# Patient Record
Sex: FEMALE | Race: BLACK OR AFRICAN AMERICAN | NOT HISPANIC OR LATINO | Employment: FULL TIME | URBAN - METROPOLITAN AREA
[De-identification: names, ages, dates, MRNs, and addresses within clinical notes are randomized per-mention and may not be internally consistent; named-entity substitution may affect disease eponyms.]

---

## 2017-01-27 ENCOUNTER — PATIENT MESSAGE (OUTPATIENT)
Dept: OBSTETRICS AND GYNECOLOGY | Facility: CLINIC | Age: 32
End: 2017-01-27

## 2017-03-13 DIAGNOSIS — Z30.40 CONTRACEPTIVE SURVEILLANCE: ICD-10-CM

## 2017-03-13 RX ORDER — NORETHINDRONE ACETATE AND ETHINYL ESTRADIOL .03; 1.5 MG/1; MG/1
1 TABLET ORAL DAILY
Qty: 28 EACH | Refills: 1 | Status: SHIPPED | OUTPATIENT
Start: 2017-03-13 | End: 2018-09-05

## 2017-03-13 NOTE — TELEPHONE ENCOUNTER
----- Message from Arabella Estrada sent at 3/13/2017  1:41 PM CDT -----  Contact: pt  _x  1st Request  _  2nd Request  _  3rd Request    Please refill the medication(s) listed below.     Medication #1norethindrone ac-eth estradiol (LOESTRIN 1.5/30, 21,) 1.5-30 mg-mcg Tab    Medication #2      Preferred Pharmacy:[facility] [address] [place] [address]

## 2017-04-12 ENCOUNTER — PATIENT MESSAGE (OUTPATIENT)
Dept: OBSTETRICS AND GYNECOLOGY | Facility: CLINIC | Age: 32
End: 2017-04-12

## 2017-04-13 ENCOUNTER — OFFICE VISIT (OUTPATIENT)
Dept: OBSTETRICS AND GYNECOLOGY | Facility: CLINIC | Age: 32
End: 2017-04-13
Payer: COMMERCIAL

## 2017-04-13 VITALS
SYSTOLIC BLOOD PRESSURE: 110 MMHG | HEIGHT: 59 IN | DIASTOLIC BLOOD PRESSURE: 70 MMHG | WEIGHT: 125.13 LBS | BODY MASS INDEX: 25.23 KG/M2

## 2017-04-13 DIAGNOSIS — N76.0 ACUTE VAGINITIS: Primary | ICD-10-CM

## 2017-04-13 DIAGNOSIS — N76.0 BV (BACTERIAL VAGINOSIS): Primary | ICD-10-CM

## 2017-04-13 DIAGNOSIS — B96.89 BV (BACTERIAL VAGINOSIS): Primary | ICD-10-CM

## 2017-04-13 LAB
CANDIDA RRNA VAG QL PROBE: NEGATIVE
G VAGINALIS RRNA GENITAL QL PROBE: POSITIVE
T VAGINALIS RRNA GENITAL QL PROBE: NEGATIVE

## 2017-04-13 PROCEDURE — 1160F RVW MEDS BY RX/DR IN RCRD: CPT | Mod: S$GLB,,, | Performed by: NURSE PRACTITIONER

## 2017-04-13 PROCEDURE — 87480 CANDIDA DNA DIR PROBE: CPT

## 2017-04-13 PROCEDURE — 99999 PR PBB SHADOW E&M-EST. PATIENT-LVL III: CPT | Mod: PBBFAC,,, | Performed by: NURSE PRACTITIONER

## 2017-04-13 PROCEDURE — 99213 OFFICE O/P EST LOW 20 MIN: CPT | Mod: S$GLB,,, | Performed by: NURSE PRACTITIONER

## 2017-04-13 RX ORDER — METRONIDAZOLE 500 MG/1
500 TABLET ORAL EVERY 12 HOURS
Qty: 14 TABLET | Refills: 0 | Status: SHIPPED | OUTPATIENT
Start: 2017-04-13 | End: 2017-04-20

## 2017-04-13 NOTE — PROGRESS NOTES
"Chayo Williamson is a 31 y.o. female  presents to urgent GYN care with complaints of thinking something is "off". Denies abnormal vaginal discharge. Denies vaginal itching. Denies odor. Wants to make sure this isn't the start of a yeast infection. Pt is on OCPs-Loestrin. Missed two consecutive cycles-negative UPTs @ home. Denies missing any pills in the pack. Finally had a cycle in March- LMP 3/28/17.       Pt sees Dr. Matthews for her OB/GYN care-has WWE scheduled 5/15/17.     History reviewed. No pertinent past medical history.  Past Surgical History:   Procedure Laterality Date     SECTION       X 2     Social History   Substance Use Topics    Smoking status: Never Smoker    Smokeless tobacco: Never Used    Alcohol use Yes      Comment: social     Family History   Problem Relation Age of Onset    Cancer Mother      breast cancer    Breast cancer Mother 45    Hypertension Maternal Grandmother     Stroke Maternal Grandmother     Diabetes Maternal Grandmother     Breast cancer Other     Colon cancer Paternal Aunt     Ovarian cancer Neg Hx      OB History    Para Term  AB SAB TAB Ectopic Multiple Living   2         2      # Outcome Date GA Lbr Antoine/2nd Weight Sex Delivery Anes PTL Lv   2             1                    /70  Ht 4' 11" (1.499 m)  Wt 56.8 kg (125 lb 1.8 oz)  LMP 2017  BMI 25.27 kg/m2    ROS:  GENERAL: No fever, chills, fatigability or weight loss.  VULVAR: No pain, no lesions and no itching.  VAGINAL: No relaxation, no itching, no discharge, no abnormal bleeding and no lesions.  ABDOMEN: No abdominal pain. Denies nausea. Denies vomiting. No diarrhea. No constipation  BREAST: Denies pain. No lumps. No discharge.  URINARY: No incontinence, no nocturia, no frequency and no dysuria.  CARDIOVASCULAR: No chest pain. No shortness of breath. No leg cramps.  NEUROLOGICAL: No headaches. No vision changes.    PHYSICAL EXAM:  VULVA: normal " appearing vulva with no masses, tenderness or lesions   VAGINA: normal appearing vagina with normal color. No abnormal discharge, no lesions   CERVIX: normal appearing cervix without discharge or lesions   UTERUS: uterus is normal size, shape, consistency and nontender   ADNEXA: normal adnexa in size, nontender and no masses    ASSESSMENT and PLAN:    ICD-10-CM ICD-9-CM    1. Acute vaginitis N76.0 616.10 Vaginosis Screen by DNA Probe     Affirm pending  Discussed amenorrhea with OCPs can be normal.     Patient was counseled today on vaginitis prevention including :  a. avoiding feminine products such as deoderant soaps, body wash, bubble bath, douches, scented toilet paper, deoderant tampons or pads, feminine wipes, chronic pad use, etc.  b. avoiding other vulvovaginal irritants such as long hot baths, humidity, tight, synthetic clothing, chlorine and sitting around in wet bathing suits  c. wearing cotton underwear, avoiding thong underwear and no underwear to bed  d. taking showers instead of baths and use a hair dryer on cool setting afterwards to dry  e. wearing cotton to exercise and shower immediately after exercise and change clothes  f. using polyurethane condoms without spermicide if sexually active and symptoms are triggered by intercourse    FOLLOW UP: PRN lack of improvement.

## 2017-05-15 ENCOUNTER — OFFICE VISIT (OUTPATIENT)
Dept: OBSTETRICS AND GYNECOLOGY | Facility: CLINIC | Age: 32
End: 2017-05-15
Attending: OBSTETRICS & GYNECOLOGY
Payer: COMMERCIAL

## 2017-05-15 VITALS
DIASTOLIC BLOOD PRESSURE: 72 MMHG | BODY MASS INDEX: 25.25 KG/M2 | HEIGHT: 59 IN | WEIGHT: 125.25 LBS | SYSTOLIC BLOOD PRESSURE: 114 MMHG

## 2017-05-15 DIAGNOSIS — Z80.3 FAMILY HISTORY OF BREAST CANCER IN MOTHER: ICD-10-CM

## 2017-05-15 DIAGNOSIS — Z01.419 ENCOUNTER FOR GYNECOLOGICAL EXAMINATION WITHOUT ABNORMAL FINDING: ICD-10-CM

## 2017-05-15 DIAGNOSIS — N76.0 VAGINITIS AND VULVOVAGINITIS, UNSPECIFIED: Primary | ICD-10-CM

## 2017-05-15 LAB
CANDIDA RRNA VAG QL PROBE: NEGATIVE
G VAGINALIS RRNA GENITAL QL PROBE: NEGATIVE
T VAGINALIS RRNA GENITAL QL PROBE: NEGATIVE

## 2017-05-15 PROCEDURE — 87480 CANDIDA DNA DIR PROBE: CPT

## 2017-05-15 PROCEDURE — 99395 PREV VISIT EST AGE 18-39: CPT | Mod: S$GLB,,, | Performed by: OBSTETRICS & GYNECOLOGY

## 2017-05-15 PROCEDURE — 99999 PR PBB SHADOW E&M-EST. PATIENT-LVL III: CPT | Mod: PBBFAC,,, | Performed by: OBSTETRICS & GYNECOLOGY

## 2017-05-15 RX ORDER — NORETHINDRONE ACETATE AND ETHINYL ESTRADIOL .03; 1.5 MG/1; MG/1
1 TABLET ORAL DAILY
Qty: 28 EACH | Refills: 12 | Status: SHIPPED | OUTPATIENT
Start: 2017-05-15 | End: 2018-09-05

## 2017-05-15 NOTE — MR AVS SNAPSHOT
[facility] [address]  [address]  [facility] [address]  Phone: [phone]  Fax: [phone]                  Chayo Williamson   5/15/2017 8:45 AM   Office Visit    Description:  Female : 1985   Provider:  Giselle Matthews MD   Department:  [facility] [address]           Reason for Visit     Annual Exam                To Do List           Goals (5 Years of Data)     None      [facility]     [facility] On Call Nurse Care Line -  Assistance  Unless otherwise directed by your provider, please contact [facility], our nurse care line that is available for  assistance.     Registered nurses in the [facility] On Call Center provide: appointment scheduling, clinical advisement, health education, and other advisory services.  Call: [phone] (toll free)               Medications           Message regarding Medications     Verify the changes and/or additions to your medication regime listed below are the same as discussed with your clinician today.  If any of these changes or additions are incorrect, please notify your healthcare provider.             Verify that the below list of medications is an accurate representation of the medications you are currently taking.  If none reported, the list may be blank. If incorrect, please contact your healthcare provider. Carry this list with you in case of emergency.           Current Medications     norethindrone ac-eth estradiol (LOESTRIN 1.5, 21,) 1.5-30 mg-mcg Tab Take 1 tablet by mouth once daily.    albuterol 90 mcg/actuation inhaler Inhale 2 puffs into the lungs every 4 (four) hours as needed for Wheezing. Use with spacer    inhalation device (AEROCHAMBER PLUS FLOW-VU) Use with inhaler dispense with mouthpiece    montelukast (SINGULAIR) 10 mg tablet Take 1 tablet (10 mg total) by mouth every evening.    naproxen (NAPROSYN) 500 MG tablet Take 1 tablet (500 mg total) by mouth 2 (two) times daily as needed  "(pain).           Clinical Reference Information           Your Vitals Were     BP Height Weight Last Period BMI    114/72 4' 11" (1.499 m) 56.8 kg (125 lb 3.5 oz) 03/20/2017 25.29 kg/m2      Blood Pressure          Most Recent Value    BP  114/72      Allergies as of 5/15/2017     No Known Allergies      Immunizations Administered on Date of Encounter - 5/15/2017     None      Language Assistance Services     ATTENTION: Language assistance services are available, free of charge. Please call [phone].      ATENCIÓN: Si habla chrisañol, tiene a rushing disposición servicios gratuitos de asistencia lingüística. Llame al [phone].     CHÚ Ý: N?u b?n nói Ti?ng Vi?t, có các d?ch v? h? tr? ngôn ng? mi?n phí dành cho b?n. G?i s? [phone].         [facility] [address] complies with applicable Federal civil rights laws and does not discriminate on the basis of race, color, national origin, age, disability, or sex.        "

## 2017-05-15 NOTE — PROGRESS NOTES
SUBJECTIVE:   31 y.o. female   for annual routine Pap and checkup. Patient's last menstrual period was 2017..  She complains of having vaginitis recently. She wants to be sure that is has resolved. Her mother had breast cancer at age 45- she is uncertain if she had genetic testing.        History reviewed. No pertinent past medical history.  Past Surgical History:   Procedure Laterality Date     SECTION       X 2     Social History     Social History    Marital status:      Spouse name: N/A    Number of children: 2    Years of education: N/A     Occupational History     [employer]     Social History Main Topics    Smoking status: Never Smoker    Smokeless tobacco: Never Used    Alcohol use Yes      Comment: social    Drug use: No    Sexual activity: Yes     Partners: Male     Birth control/ protection: Condom, OCP     Other Topics Concern    Not on file     Social History Narrative     Family History   Problem Relation Age of Onset    Cancer Mother      breast cancer    Breast cancer Mother 45    Hypertension Maternal Grandmother     Stroke Maternal Grandmother     Diabetes Maternal Grandmother     Breast cancer Other     Colon cancer Paternal Aunt     Ovarian cancer Neg Hx      OB History    Para Term  AB SAB TAB Ectopic Multiple Living   2         2      # Outcome Date GA Lbr Antoine/2nd Weight Sex Delivery Anes PTL Lv   2             1                      Current Outpatient Prescriptions   Medication Sig Dispense Refill    norethindrone ac-eth estradiol (LOESTRIN 1.5/30, 21,) 1.5-30 mg-mcg Tab Take 1 tablet by mouth once daily. 28 each 1    albuterol 90 mcg/actuation inhaler Inhale 2 puffs into the lungs every 4 (four) hours as needed for Wheezing. Use with spacer 1 Inhaler 11    inhalation device (AEROCHAMBER PLUS FLOW-VU) Use with inhaler dispense with mouthpiece 1 Device 1    montelukast (SINGULAIR) 10  mg tablet Take 1 tablet (10 mg total) by mouth every evening. 30 tablet 3    naproxen (NAPROSYN) 500 MG tablet Take 1 tablet (500 mg total) by mouth 2 (two) times daily as needed (pain). 20 tablet 0    norethindrone ac-eth estradiol (LOESTRIN 1.5/30, 21,) 1.5-30 mg-mcg Tab Take 1 tablet by mouth once daily. 28 each 12     No current facility-administered medications for this visit.      Allergies: Review of patient's allergies indicates no known allergies.     ROS:  Constitutional: no weight loss, weight gain, fever, fatigue  Eyes:  No vision changes, glasses/contacts  ENT/Mouth: No ulcers, sinus problems, ears ringing, headache  Cardiovascular: No inability to lie flat, chest pain, exercise intolerance, swelling, heart palpitations  Respiratory: No wheezing, coughing blood, shortness of breath, or cough  Gastrointestinal: No diarrhea, bloody stool, nausea/vomiting, constipation, gas, hemorrhoids  Genitourinary: No blood in urine, painful urination, urgency of urination, frequency of urination, incomplete emptying, incontinence, abnormal bleeding, painful periods, heavy periods, vaginal discharge, vaginal odor, painful intercourse, sexual problems, bleeding after intercourse.  Musculoskeletal: No muscle weakness  Skin/Breast: No painful breasts, nipple discharge, masses, rash, ulcers  Neurological: No passing out, seizures, numbness, headache  Endocrine: No diabetes, hypothyroid, hyperthyroid, hot flashes, hair loss, abnormal hair growth, acne  Psychiatric: No depression, crying  Hematologic: No bruises, bleeding, swollen lymph nodes, anemia.      Physical Exam:   Constitutional: She is oriented to person, place, and time. She appears well-developed and well-nourished.      Neck: Normal range of motion. No tracheal deviation present. No thyromegaly present.    Cardiovascular: Exam reveals no edema.     Pulmonary/Chest: Effort normal. She exhibits no mass, no tenderness, no deformity and no retraction. Right breast  exhibits no inverted nipple, no mass, no nipple discharge, no skin change, no tenderness, presence, no bleeding and no swelling. Left breast exhibits no inverted nipple, no mass, no nipple discharge, no skin change, no tenderness, presence, no bleeding and no swelling. Breasts are symmetrical.        Abdominal: Soft. She exhibits no distension and no mass. There is no tenderness. There is no rebound and no guarding. No hernia. Hernia confirmed negative in the left inguinal area.     Genitourinary: Vagina normal and uterus normal. Rectal exam shows no external hemorrhoid. There is no rash, tenderness or lesion on the right labia. There is no rash, tenderness or lesion on the left labia. Uterus is not deviated. Cervix is normal. No no adexnal prolapse. Right adnexum displays no mass, no tenderness and no fullness. Left adnexum displays no mass, no tenderness and no fullness. No tenderness, bleeding, rectocele, cystocele or unspecified prolapse of vaginal walls in the vagina. No vaginal discharge found. Cervix exhibits no motion tenderness, no discharge and no friability.           Musculoskeletal: Normal range of motion and moves all extremeties. She exhibits no edema.      Lymphadenopathy:        Right: No inguinal adenopathy present.        Left: No inguinal adenopathy present.    Neurological: She is alert and oriented to person, place, and time.    Skin: No rash noted. No erythema. No pallor.    Psychiatric: She has a normal mood and affect. Her behavior is normal. Judgment and thought content normal.         ASSESSMENT:   well woman  no contraindication to continue use of oral contraceptives  Vaginitis  Family history of breast cancer  PLAN:   return annually or prn  Patient's personal and family history is suggestive of hereditary breast cancer secondary to a BRCA1/2 mutation. Options for genetic testing discussed in detail including possible test results, implications for self and family, and insurance issues.  Patient to find out if her mother had genetic testing- what were the results and how long ago

## 2017-05-24 ENCOUNTER — PATIENT MESSAGE (OUTPATIENT)
Dept: OBSTETRICS AND GYNECOLOGY | Facility: CLINIC | Age: 32
End: 2017-05-24

## 2017-05-26 ENCOUNTER — TELEPHONE (OUTPATIENT)
Dept: OBSTETRICS AND GYNECOLOGY | Facility: CLINIC | Age: 32
End: 2017-05-26

## 2017-12-20 ENCOUNTER — OFFICE VISIT (OUTPATIENT)
Dept: URGENT CARE | Facility: CLINIC | Age: 32
End: 2017-12-20
Payer: COMMERCIAL

## 2017-12-20 VITALS
BODY MASS INDEX: 26.21 KG/M2 | WEIGHT: 130 LBS | HEART RATE: 84 BPM | RESPIRATION RATE: 18 BRPM | TEMPERATURE: 99 F | DIASTOLIC BLOOD PRESSURE: 73 MMHG | OXYGEN SATURATION: 100 % | HEIGHT: 59 IN | SYSTOLIC BLOOD PRESSURE: 111 MMHG

## 2017-12-20 DIAGNOSIS — R09.1 PLEURISY: Primary | ICD-10-CM

## 2017-12-20 LAB
B-HCG UR QL: NEGATIVE
CTP QC/QA: YES

## 2017-12-20 PROCEDURE — 81025 URINE PREGNANCY TEST: CPT | Mod: S$GLB,,, | Performed by: EMERGENCY MEDICINE

## 2017-12-20 PROCEDURE — 96372 THER/PROPH/DIAG INJ SC/IM: CPT | Mod: S$GLB,,, | Performed by: EMERGENCY MEDICINE

## 2017-12-20 PROCEDURE — 99214 OFFICE O/P EST MOD 30 MIN: CPT | Mod: 25,S$GLB,, | Performed by: EMERGENCY MEDICINE

## 2017-12-20 RX ORDER — KETOROLAC TROMETHAMINE 30 MG/ML
30 INJECTION, SOLUTION INTRAMUSCULAR; INTRAVENOUS
Status: COMPLETED | OUTPATIENT
Start: 2017-12-20 | End: 2017-12-20

## 2017-12-20 RX ORDER — HYDROCODONE BITARTRATE AND ACETAMINOPHEN 5; 325 MG/1; MG/1
1 TABLET ORAL EVERY 6 HOURS PRN
Qty: 16 TABLET | Refills: 0 | Status: SHIPPED | OUTPATIENT
Start: 2017-12-20 | End: 2018-09-05

## 2017-12-20 RX ORDER — MELOXICAM 15 MG/1
15 TABLET ORAL DAILY
Qty: 7 TABLET | Refills: 0 | Status: SHIPPED | OUTPATIENT
Start: 2017-12-20 | End: 2017-12-27

## 2017-12-20 RX ADMIN — KETOROLAC TROMETHAMINE 30 MG: 30 INJECTION, SOLUTION INTRAMUSCULAR; INTRAVENOUS at 08:12

## 2017-12-21 NOTE — PATIENT INSTRUCTIONS
Please return here or go to the Emergency Department for any concerns or worsening of condition.  If you were prescribed antibiotics, please take them to completion.  If you were prescribed a narcotic medication, do not drive or operate heavy equipment or machinery while taking these medications.  Please follow up with your primary care doctor or specialist as needed.  If you  smoke, please stop smoking.      Pleurisy  You have pain in your chest. Your healthcare provider has told you that you have pleurisy, or pleuritis. Pleurisy is swelling (inflammation) of the pleura. The pleura are two layers of thin smooth tissue that surround the lungs and line the chest.  What are the symptoms of pleurisy?     Pleurisy is inflammation of the pleura. The pleura cover the lungs and line the chest.   Pleurisy usually causes sharp chest pain. It is usually worse when you take a deep breath, cough, or sneeze.  What causes pleurisy?  Many things can cause pleurisy. A common cause is a viral infection like the flu or pneumonia. Serious lung problems that can cause it include:  · A blood clot in the lung (pulmonary embolism)  · Air between the pleura (pneumothorax)  Serious heart problems that can cause pleurisy include:  · Heart attack  · Inflammation of the covering of the heart (pericarditis)  How is pleurisy diagnosed?  Your healthcare provider examines you and asks you about your symptoms and health history. He or she will first check you for the serious causes of chest pain. You may have:  · Lab tests  · Imaging tests such as chest X-ray, CT scan, or ultrasound  · EKG  How is pleurisy treated?  Treatment depends on what is causing the pleurisy. Serious conditions are treated in the hospital. You may need medicines to decrease the inflammation and pain.     Call 911  Call 911 if any of these occur:  · Trouble breathing  · Chest pain that gets worse  Call your healthcare provider  Call your healthcare provider right away if you  have:  · A fever of 100.4°F (38°C) or higher, or as directed by your healthcare provider   Date Last Reviewed: 11/1/2016 © 2000-2017 [employer]. [address] [place] [address], [place] [address]. All rights reserved. This information is not intended as a substitute for professional medical care. Always follow your healthcare professional's instructions.

## 2017-12-21 NOTE — PROGRESS NOTES
Subjective:       Patient ID: Chayo Williamson is a 32 y.o. female.    Vitals:    12/20/17 1955   BP: 111/73   Pulse: 84   Resp: 18   Temp: 98.5 °F (36.9 °C)       Chief Complaint: Hip Pain    Pr reports that she started experiencing pain under the right breast and the right shoulder blade. Pain started today. She states that she feels the pain more when breathing.       Chest Pain    This is a new problem. The current episode started today. The onset quality is sudden. The problem occurs constantly. The problem has been gradually worsening. The pain is present in the lateral region. The pain is at a severity of 8/10. The pain is moderate. The quality of the pain is described as sharp. The pain does not radiate. Pertinent negatives include no abdominal pain, back pain, fever, headaches, nausea, shortness of breath or vomiting. The pain is aggravated by breathing, deep breathing and movement. She has tried nothing for the symptoms. There are no known risk factors.     Review of Systems   Constitution: Negative for chills and fever.   HENT: Negative for sore throat.    Eyes: Negative for blurred vision.   Cardiovascular: Negative for chest pain.   Respiratory: Negative for shortness of breath.    Skin: Negative for rash.   Musculoskeletal: Positive for joint pain. Negative for back pain.   Gastrointestinal: Negative for abdominal pain, diarrhea, nausea and vomiting.   Neurological: Negative for headaches.   Psychiatric/Behavioral: The patient is not nervous/anxious.        Objective:      Physical Exam   Constitutional: She is oriented to person, place, and time. She appears well-developed and well-nourished. She is cooperative.  Non-toxic appearance. She does not appear ill. No distress.   HENT:   Head: Normocephalic and atraumatic.   Right Ear: Hearing, tympanic membrane, external ear and ear canal normal.   Left Ear: Hearing, tympanic membrane, external ear and ear canal normal.   Nose: Nose normal. No  mucosal edema, rhinorrhea or nasal deformity. No epistaxis. Right sinus exhibits no maxillary sinus tenderness and no frontal sinus tenderness. Left sinus exhibits no maxillary sinus tenderness and no frontal sinus tenderness.   Mouth/Throat: Uvula is midline, oropharynx is clear and moist and mucous membranes are normal. No trismus in the jaw. Normal dentition. No uvula swelling. No posterior oropharyngeal erythema.   Eyes: Conjunctivae and lids are normal. Right eye exhibits no discharge. Left eye exhibits no discharge. No scleral icterus.   Sclera clear bilat   Neck: Trachea normal, normal range of motion, full passive range of motion without pain and phonation normal. Neck supple.   Cardiovascular: Normal rate, regular rhythm, normal heart sounds, intact distal pulses and normal pulses.    Pulmonary/Chest: Effort normal and breath sounds normal. No respiratory distress.       Abdominal: Soft. Normal appearance and bowel sounds are normal. She exhibits no distension, no pulsatile midline mass and no mass. There is no tenderness.   Musculoskeletal: Normal range of motion. She exhibits no edema or deformity.   Neurological: She is alert and oriented to person, place, and time. She exhibits normal muscle tone. Coordination normal.   Skin: Skin is warm, dry and intact. She is not diaphoretic. No pallor.   Psychiatric: She has a normal mood and affect. Her speech is normal and behavior is normal. Judgment and thought content normal. Cognition and memory are normal.   Nursing note and vitals reviewed.    A Chest X-Ray was ordered. My reading of this film is neg. (No comparison films available: pending review by Radiologist.)    Assessment:       1. Pleurisy        Plan:       Chayo was seen today for hip pain.    Diagnoses and all orders for this visit:    Pleurisy  -     ketorolac injection 30 mg; Inject 30 mg into the muscle one time.  -     X-Ray Chest PA And Lateral; Future  -     POCT urine pregnancy  -      meloxicam (MOBIC) 15 MG tablet; Take 1 tablet (15 mg total) by mouth once daily.  -     hydrocodone-acetaminophen 5-325mg (NORCO) 5-325 mg per tablet; Take 1 tablet by mouth every 6 (six) hours as needed for Pain.

## 2018-01-08 ENCOUNTER — OFFICE VISIT (OUTPATIENT)
Dept: URGENT CARE | Facility: CLINIC | Age: 33
End: 2018-01-08
Payer: COMMERCIAL

## 2018-01-08 VITALS
SYSTOLIC BLOOD PRESSURE: 111 MMHG | TEMPERATURE: 98 F | RESPIRATION RATE: 18 BRPM | OXYGEN SATURATION: 100 % | HEART RATE: 86 BPM | DIASTOLIC BLOOD PRESSURE: 66 MMHG | BODY MASS INDEX: 26.21 KG/M2 | HEIGHT: 59 IN | WEIGHT: 130 LBS

## 2018-01-08 DIAGNOSIS — Z20.828 EXPOSURE TO THE FLU: Primary | ICD-10-CM

## 2018-01-08 DIAGNOSIS — J06.9 URI, ACUTE: ICD-10-CM

## 2018-01-08 LAB
CTP QC/QA: YES
FLUAV AG NPH QL: NEGATIVE
FLUBV AG NPH QL: NEGATIVE

## 2018-01-08 PROCEDURE — 87804 INFLUENZA ASSAY W/OPTIC: CPT | Mod: 59,QW,S$GLB, | Performed by: EMERGENCY MEDICINE

## 2018-01-08 PROCEDURE — 96372 THER/PROPH/DIAG INJ SC/IM: CPT | Mod: S$GLB,,, | Performed by: EMERGENCY MEDICINE

## 2018-01-08 PROCEDURE — 99214 OFFICE O/P EST MOD 30 MIN: CPT | Mod: 25,S$GLB,, | Performed by: EMERGENCY MEDICINE

## 2018-01-08 RX ORDER — BETAMETHASONE SODIUM PHOSPHATE AND BETAMETHASONE ACETATE 3; 3 MG/ML; MG/ML
6 INJECTION, SUSPENSION INTRA-ARTICULAR; INTRALESIONAL; INTRAMUSCULAR; SOFT TISSUE
Status: COMPLETED | OUTPATIENT
Start: 2018-01-08 | End: 2018-01-08

## 2018-01-08 RX ORDER — AZITHROMYCIN 250 MG/1
TABLET, FILM COATED ORAL
Qty: 6 TABLET | Refills: 0 | Status: SHIPPED | OUTPATIENT
Start: 2018-01-08 | End: 2018-01-13

## 2018-01-08 RX ADMIN — BETAMETHASONE SODIUM PHOSPHATE AND BETAMETHASONE ACETATE 6 MG: 3; 3 INJECTION, SUSPENSION INTRA-ARTICULAR; INTRALESIONAL; INTRAMUSCULAR; SOFT TISSUE at 04:01

## 2018-01-08 NOTE — PATIENT INSTRUCTIONS
REST AND HYDRATE WITH PLENTY OF FLUIDS  CELESTONE 1 CC GIVEN IN CLINIC  MOTRIN 600 MG EVERY 6 HOURS FOR FEVER/ACHES  OK TO ADD TYLENOL 650 MG EVERY 4 HOURS FOR FEVER/ACHES NOT RELIEVED WITH MOTRIN  MUCINEX DM TWICE DAILY FOR COUGH  ZYRTEC OR CLARITIN OR ALLEGRA FOR CONGESTION/POST NASAL DRIP/RUNNY NOSE  FOLLOW UP WITH PCP  GO TO THE ER IF WORSE IN ANY WAY  ONLY START THE TAMIFLU IF HAVING FEVERS CONSISTENTLY OVER 100, BODY ACHES, HEADACHE, FATIGUE, AND MORE SYSTEMIC SYMPTOMS THAN YOU HAVE NOW AS YOUR SYMPTOMS NOW ARE MORE CONSISTENT WITH A URI      RX'S AS FOLLOW  ZPACK  GIVEN IN CLINIC  CELESTONE 1 CC        Viral Upper Respiratory Illness (Adult)  You have a viral upper respiratory illness (URI), which is another term for the common cold. This illness is contagious during the first few days. It is spread through the air by coughing and sneezing. It may also be spread by direct contact (touching the sick person and then touching your own eyes, nose, or mouth). Frequent handwashing will decrease risk of spread. Most viral illnesses go away within 7 to 10 days with rest and simple home remedies. Sometimes the illness may last for several weeks. Antibiotics will not kill a virus, and they are generally not prescribed for this condition.    Home care  · If symptoms are severe, rest at home for the first 2 to 3 days. When you resume activity, don't let yourself get too tired.  · Avoid being exposed to cigarette smoke (yours or others).  · You may use acetaminophen or ibuprofen to control pain and fever, unless another medicine was prescribed. (Note: If you have chronic liver or kidney disease, have ever had a stomach ulcer or gastrointestinal bleeding, or are taking blood-thinning medicines, talk with your healthcare provider before using these medicines.) Aspirin should never be given to anyone under 18 years of age who is ill with a viral infection or fever. It may cause severe liver or brain damage.  · Your appetite  may be poor, so a light diet is fine. Avoid dehydration by drinking 6 to 8 glasses of fluids per day (water, soft drinks, juices, tea, or soup). Extra fluids will help loosen secretions in the nose and lungs.  · Over-the-counter cold medicines will not shorten the length of time youre sick, but they may be helpful for the following symptoms: cough, sore throat, and nasal and sinus congestion. (Note: Do not use decongestants if you have high blood pressure.)  Follow-up care  Follow up with your healthcare provider, or as advised.  When to seek medical advice  Call your healthcare provider right away if any of these occur:  · Cough with lots of colored sputum (mucus)  · Severe headache; face, neck, or ear pain  · Difficulty swallowing due to throat pain  · Fever of 100.4°F (38°C)  Call 911, or get immediate medical care  Call emergency services right away if any of these occur:  · Chest pain, shortness of breath, wheezing, or difficulty breathing  · Coughing up blood  · Inability to swallow due to throat pain  Date Last Reviewed: 9/13/2015  © 0403-4892 [employer]. [address] [place] [address]. All rights reserved. This information is not intended as a substitute for professional medical care. Always follow your healthcare professional's instructions.

## 2018-01-08 NOTE — PROGRESS NOTES
"Subjective:       Patient ID: Chayo Williamson is a 32 y.o. female.    Vitals:  height is 4' 11" (1.499 m) and weight is 59 kg (130 lb). Her tympanic temperature is 97.6 °F (36.4 °C). Her blood pressure is 111/66 and her pulse is 86. Her respiration is 18 and oxygen saturation is 100%.     Chief Complaint: Sinus Problem    Pt states her son was dx with the flu on Saturday by his ped and she has body aches, cough, and thought she had fever yesterday, but did not take temp. No fevers, no chills, does already have tamiflu for her but has not been taking it.       Sinus Problem   This is a new problem. The current episode started yesterday. The problem has been gradually worsening since onset. There has been no fever. The fever has been present for less than 1 day. Her pain is at a severity of 9/10. The pain is severe. Associated symptoms include coughing and a sore throat. Pertinent negatives include no chills, congestion, ear pain, headaches, hoarse voice or shortness of breath. Treatments tried: dayquil today, vitamin c. The treatment provided mild relief.     Review of Systems   Constitution: Negative for chills, fever and malaise/fatigue.        Body aches   HENT: Positive for sore throat. Negative for congestion, ear pain and hoarse voice.    Eyes: Negative for discharge and redness.   Cardiovascular: Negative for chest pain, dyspnea on exertion and leg swelling.   Respiratory: Positive for cough. Negative for shortness of breath, sputum production and wheezing.    Musculoskeletal: Negative for myalgias.   Gastrointestinal: Negative for abdominal pain and nausea.   Neurological: Negative for headaches.       Objective:      Physical Exam   Constitutional: She is oriented to person, place, and time. She appears well-developed and well-nourished.   HENT:   Head: Normocephalic and atraumatic.   Right Ear: External ear normal.   Left Ear: External ear normal.   TM LEFT-nl  TM RIGHT-nl  NASAL DISCHARGE- " nl  OROPHARYNX-mild erythema   Eyes: Conjunctivae and EOM are normal.   Neck: Normal range of motion. Neck supple.   Cardiovascular: Normal rate and regular rhythm.    Pulmonary/Chest: Effort normal and breath sounds normal. She has no wheezes.   Dry cough   Abdominal: Soft. Bowel sounds are normal.   Lymphadenopathy:     She has no cervical adenopathy.   Neurological: She is alert and oriented to person, place, and time.   Psychiatric: She has a normal mood and affect. Her behavior is normal.   Nursing note and vitals reviewed.        Office Visit on 01/08/2018   Component Date Value Ref Range Status    Rapid Influenza A Ag 01/08/2018 Negative  Negative Final    Rapid Influenza B Ag 01/08/2018 Negative  Negative Final     Acceptable 01/08/2018 Yes   Final       Assessment:       1. Exposure to the flu    2. URI, acute        Plan:         Exposure to the flu  -     POCT Influenza A/B    URI, acute    Other orders  -     betamethasone acetate-betamethasone sodium phosphate injection 6 mg; Inject 1 mL (6 mg total) into the muscle one time.  -     azithromycin (Z-KEYONA) 250 MG tablet; Take 2 tablets by mouth on day 1; Take 1 tablet by mouth on days 2-5  Dispense: 6 tablet; Refill: 0      Patient Instructions   REST AND HYDRATE WITH PLENTY OF FLUIDS  CELESTONE 1 CC GIVEN IN CLINIC  MOTRIN 600 MG EVERY 6 HOURS FOR FEVER/ACHES  OK TO ADD TYLENOL 650 MG EVERY 4 HOURS FOR FEVER/ACHES NOT RELIEVED WITH MOTRIN  MUCINEX DM TWICE DAILY FOR COUGH  ZYRTEC OR CLARITIN OR ALLEGRA FOR CONGESTION/POST NASAL DRIP/RUNNY NOSE  FOLLOW UP WITH PCP  GO TO THE ER IF WORSE IN ANY WAY  ONLY START THE TAMIFLU IF HAVING FEVERS CONSISTENTLY OVER 100, BODY ACHES, HEADACHE, FATIGUE, AND MORE SYSTEMIC SYMPTOMS THAN YOU HAVE NOW AS YOUR SYMPTOMS NOW ARE MORE CONSISTENT WITH A URI      RX'S AS FOLLOW  ZPACK  GIVEN IN CLINIC  CELESTONE 1 CC        Viral Upper Respiratory Illness (Adult)  You have a viral upper respiratory illness  (URI), which is another term for the common cold. This illness is contagious during the first few days. It is spread through the air by coughing and sneezing. It may also be spread by direct contact (touching the sick person and then touching your own eyes, nose, or mouth). Frequent handwashing will decrease risk of spread. Most viral illnesses go away within 7 to 10 days with rest and simple home remedies. Sometimes the illness may last for several weeks. Antibiotics will not kill a virus, and they are generally not prescribed for this condition.    Home care  · If symptoms are severe, rest at home for the first 2 to 3 days. When you resume activity, don't let yourself get too tired.  · Avoid being exposed to cigarette smoke (yours or others).  · You may use acetaminophen or ibuprofen to control pain and fever, unless another medicine was prescribed. (Note: If you have chronic liver or kidney disease, have ever had a stomach ulcer or gastrointestinal bleeding, or are taking blood-thinning medicines, talk with your healthcare provider before using these medicines.) Aspirin should never be given to anyone under 18 years of age who is ill with a viral infection or fever. It may cause severe liver or brain damage.  · Your appetite may be poor, so a light diet is fine. Avoid dehydration by drinking 6 to 8 glasses of fluids per day (water, soft drinks, juices, tea, or soup). Extra fluids will help loosen secretions in the nose and lungs.  · Over-the-counter cold medicines will not shorten the length of time youre sick, but they may be helpful for the following symptoms: cough, sore throat, and nasal and sinus congestion. (Note: Do not use decongestants if you have high blood pressure.)  Follow-up care  Follow up with your healthcare provider, or as advised.  When to seek medical advice  Call your healthcare provider right away if any of these occur:  · Cough with lots of colored sputum (mucus)  · Severe headache; face,  neck, or ear pain  · Difficulty swallowing due to throat pain  · Fever of 100.4°F (38°C)  Call 911, or get immediate medical care  Call emergency services right away if any of these occur:  · Chest pain, shortness of breath, wheezing, or difficulty breathing  · Coughing up blood  · Inability to swallow due to throat pain  Date Last Reviewed: 9/13/2015  © 9471-8477 [employer]. [address] [place] [address], [place] [address]. All rights reserved. This information is not intended as a substitute for professional medical care. Always follow your healthcare professional's instructions.

## 2018-01-11 ENCOUNTER — OFFICE VISIT (OUTPATIENT)
Dept: INTERNAL MEDICINE | Facility: CLINIC | Age: 33
End: 2018-01-11
Payer: COMMERCIAL

## 2018-01-11 VITALS
HEIGHT: 59 IN | TEMPERATURE: 98 F | OXYGEN SATURATION: 98 % | WEIGHT: 124.56 LBS | DIASTOLIC BLOOD PRESSURE: 60 MMHG | SYSTOLIC BLOOD PRESSURE: 100 MMHG | RESPIRATION RATE: 18 BRPM | HEART RATE: 104 BPM | BODY MASS INDEX: 25.11 KG/M2

## 2018-01-11 DIAGNOSIS — J01.40 ACUTE PANSINUSITIS, RECURRENCE NOT SPECIFIED: Primary | ICD-10-CM

## 2018-01-11 PROCEDURE — 96372 THER/PROPH/DIAG INJ SC/IM: CPT | Mod: S$GLB,,, | Performed by: FAMILY MEDICINE

## 2018-01-11 PROCEDURE — 99213 OFFICE O/P EST LOW 20 MIN: CPT | Mod: 25,S$GLB,, | Performed by: FAMILY MEDICINE

## 2018-01-11 PROCEDURE — 99999 PR PBB SHADOW E&M-EST. PATIENT-LVL IV: CPT | Mod: PBBFAC,,, | Performed by: FAMILY MEDICINE

## 2018-01-11 RX ORDER — CEFTRIAXONE 250 MG/1
250 INJECTION, POWDER, FOR SOLUTION INTRAMUSCULAR; INTRAVENOUS
Status: COMPLETED | OUTPATIENT
Start: 2018-01-11 | End: 2018-01-11

## 2018-01-11 RX ORDER — FLUTICASONE PROPIONATE 50 MCG
2 SPRAY, SUSPENSION (ML) NASAL DAILY
Qty: 16 G | Refills: 11 | Status: SHIPPED | OUTPATIENT
Start: 2018-01-11 | End: 2018-02-10

## 2018-01-11 RX ORDER — BENZONATATE 200 MG/1
200 CAPSULE ORAL 3 TIMES DAILY PRN
Qty: 30 CAPSULE | Refills: 0 | Status: SHIPPED | OUTPATIENT
Start: 2018-01-11 | End: 2018-01-21

## 2018-01-11 RX ADMIN — CEFTRIAXONE 250 MG: 250 INJECTION, POWDER, FOR SOLUTION INTRAMUSCULAR; INTRAVENOUS at 02:01

## 2018-01-11 NOTE — PROGRESS NOTES
Subjective:       Patient ID: Chayo Williamson is a 32 y.o. female.    Chief Complaint: Headache (? flu / son Dx w/ flu on saturday); Nasal Congestion (pt dx w/ URI on sunday @ urgent care / feels worst); and Generalized Body Aches    HPI 32-year-old -American female presents to clinic today secondary to a complaints of continued subjective fever and chills, nasal congestion, ear pain, postnasal drip, sinus pressure, sore throat, chest congestion, cough productive of clear sputum, and headaches that she feels has been worsening since being seen in urgent care on Monday.  She reports that she took her son to urgent care on Saturday and he tested positive for the flu.  She then began having symptoms on Sunday and was seen in urgent care on Monday.  Her flu swab was negative.  She was diagnosed with a URI and was started on azithromycin which she has completed and given a steroid shot.  She is also been taking Mucinex D without relief.  Review of Systems   Constitutional: Positive for chills, fatigue and fever. Negative for appetite change.   HENT: Positive for congestion, ear pain, postnasal drip, rhinorrhea, sinus pressure and sore throat. Negative for hearing loss and tinnitus.    Eyes: Negative for redness, itching and visual disturbance.   Respiratory: Positive for cough (Clear sputum) and chest tightness. Negative for shortness of breath.    Cardiovascular: Negative for chest pain and palpitations.   Gastrointestinal: Negative for abdominal pain, constipation, diarrhea, nausea and vomiting.   Genitourinary: Negative for decreased urine volume, difficulty urinating, dysuria, frequency, hematuria and urgency.   Musculoskeletal: Negative for back pain, myalgias, neck pain and neck stiffness.   Skin: Negative for rash.   Neurological: Positive for headaches. Negative for dizziness and light-headedness.   Psychiatric/Behavioral: Negative.        Objective:      Physical Exam   Constitutional: She is  oriented to person, place, and time. She appears well-developed and well-nourished. No distress.   HENT:   Head: Normocephalic and atraumatic.   Right Ear: External ear normal. A middle ear effusion is present.   Left Ear: External ear normal. A middle ear effusion is present.   Nose: Mucosal edema and rhinorrhea present. No nose lacerations, sinus tenderness, nasal deformity, septal deviation or nasal septal hematoma. No epistaxis.  No foreign bodies. Right sinus exhibits maxillary sinus tenderness and frontal sinus tenderness. Left sinus exhibits maxillary sinus tenderness and frontal sinus tenderness.   Mouth/Throat: Oropharynx is clear and moist. No oropharyngeal exudate.   Eyes: Conjunctivae and EOM are normal. Pupils are equal, round, and reactive to light. Right eye exhibits no discharge. Left eye exhibits no discharge. No scleral icterus.   Neck: Normal range of motion. Neck supple. No JVD present. No tracheal deviation present. No thyromegaly present.   Cardiovascular: Normal rate, regular rhythm, normal heart sounds and intact distal pulses.  Exam reveals no gallop and no friction rub.    No murmur heard.  Pulmonary/Chest: Effort normal and breath sounds normal. No stridor. No respiratory distress. She has no wheezes. She has no rales.   Abdominal: Soft. Bowel sounds are normal. She exhibits no distension and no mass. There is no tenderness. There is no rebound and no guarding.   Musculoskeletal: Normal range of motion. She exhibits no edema or tenderness.   Lymphadenopathy:     She has no cervical adenopathy.   Neurological: She is alert and oriented to person, place, and time.   Skin: Skin is warm and dry. No rash noted. She is not diaphoretic. No erythema. No pallor.   Psychiatric: She has a normal mood and affect. Her behavior is normal. Judgment and thought content normal.   Nursing note and vitals reviewed.      Assessment:       1. Acute pansinusitis, recurrence not specified        Plan:        Acute pansinusitis, recurrence not specified  -     fluticasone (FLONASE) 50 mcg/actuation nasal spray; 2 sprays (100 mcg total) by Each Nare route once daily.  Dispense: 16 g; Refill: 11  -     benzonatate (TESSALON) 200 MG capsule; Take 1 capsule (200 mg total) by mouth 3 (three) times daily as needed for Cough.  Dispense: 30 capsule; Refill: 0  -     cefTRIAXone injection 250 mg; Inject 250 mg into the muscle one time.      Tylenol and ibuprofen as needed for fever or pain.  Saltwater or Listerine gargle as needed for sore throat.  Nasal saline or nasal sinus rinse as needed for congestion.  Over-the-counter Claritin nightly.  Return to clinic as needed if symptoms persist or worsen.

## 2018-04-24 ENCOUNTER — TELEPHONE (OUTPATIENT)
Dept: OBSTETRICS AND GYNECOLOGY | Facility: CLINIC | Age: 33
End: 2018-04-24

## 2018-04-24 NOTE — TELEPHONE ENCOUNTER
pt called to reschedule appointment from 5/11. Rescheduled to 5/16. Pt needed a appointment after 5/15

## 2018-04-24 NOTE — TELEPHONE ENCOUNTER
----- Message from Dinora Hampton sent at 4/24/2018  2:24 PM CDT -----  Contact: pt            Name of Who is Calling: CHACHO SO [[id number]]      What is the request in detail: pt returning call.. Please advise      Can the clinic reply by [facility]: no    What [facility] [phone]

## 2018-05-05 ENCOUNTER — PATIENT MESSAGE (OUTPATIENT)
Dept: OBSTETRICS AND GYNECOLOGY | Facility: CLINIC | Age: 33
End: 2018-05-05

## 2018-05-23 ENCOUNTER — OFFICE VISIT (OUTPATIENT)
Dept: OBSTETRICS AND GYNECOLOGY | Facility: CLINIC | Age: 33
End: 2018-05-23
Attending: OBSTETRICS & GYNECOLOGY
Payer: COMMERCIAL

## 2018-05-23 ENCOUNTER — LAB VISIT (OUTPATIENT)
Dept: LAB | Facility: OTHER | Age: 33
End: 2018-05-23
Attending: OBSTETRICS & GYNECOLOGY
Payer: COMMERCIAL

## 2018-05-23 VITALS
DIASTOLIC BLOOD PRESSURE: 60 MMHG | WEIGHT: 126.88 LBS | SYSTOLIC BLOOD PRESSURE: 120 MMHG | HEIGHT: 59 IN | BODY MASS INDEX: 25.58 KG/M2

## 2018-05-23 DIAGNOSIS — N92.6 IRREGULAR MENSES: ICD-10-CM

## 2018-05-23 DIAGNOSIS — N92.6 IRREGULAR MENSES: Primary | ICD-10-CM

## 2018-05-23 DIAGNOSIS — Z01.419 ENCOUNTER FOR GYNECOLOGICAL EXAMINATION WITHOUT ABNORMAL FINDING: ICD-10-CM

## 2018-05-23 DIAGNOSIS — Z12.4 SCREENING FOR MALIGNANT NEOPLASM OF CERVIX: ICD-10-CM

## 2018-05-23 LAB
T3FREE SERPL-MCNC: 3 PG/ML
T4 FREE SERPL-MCNC: 0.99 NG/DL
TSH SERPL DL<=0.005 MIU/L-ACNC: 1.01 UIU/ML

## 2018-05-23 PROCEDURE — 84481 FREE ASSAY (FT-3): CPT

## 2018-05-23 PROCEDURE — 88175 CYTOPATH C/V AUTO FLUID REDO: CPT

## 2018-05-23 PROCEDURE — 84439 ASSAY OF FREE THYROXINE: CPT

## 2018-05-23 PROCEDURE — 36415 COLL VENOUS BLD VENIPUNCTURE: CPT

## 2018-05-23 PROCEDURE — 83520 IMMUNOASSAY QUANT NOS NONAB: CPT

## 2018-05-23 PROCEDURE — 99395 PREV VISIT EST AGE 18-39: CPT | Mod: S$GLB,,, | Performed by: OBSTETRICS & GYNECOLOGY

## 2018-05-23 PROCEDURE — 84443 ASSAY THYROID STIM HORMONE: CPT

## 2018-05-23 NOTE — PROGRESS NOTES
SUBJECTIVE:   32 y.o. female   for annual routine Pap and checkup. Patient's last menstrual period was 2018..  She reports stopping OCPS in October. She desires pregnancy. She is using an Baljinder to track ovulation- she reports having sex every other day for a few days.. She reports her cycles are regular.she reports her cycles last 7 days and she has heavy bleeding for 5. She desires pregnancy but being off the pill has been difficult because of her painful cycles. She takes Aleve or Advil occasionally. .        History reviewed. No pertinent past medical history.  Past Surgical History:   Procedure Laterality Date     SECTION       X 2     Social History     Social History    Marital status:      Spouse name: N/A    Number of children: 2    Years of education: N/A     Occupational History     [employer]     Social History Main Topics    Smoking status: Never Smoker    Smokeless tobacco: Never Used    Alcohol use Yes      Comment: social    Drug use: No    Sexual activity: Yes     Partners: Male     Birth control/ protection: Condom, OCP     Other Topics Concern    Not on file     Social History Narrative    No narrative on file     Family History   Problem Relation Age of Onset    Cancer Mother         breast cancer    Breast cancer Mother 45    Hypertension Maternal Grandmother     Stroke Maternal Grandmother     Diabetes Maternal Grandmother     Breast cancer Other     Colon cancer Paternal Aunt     Ovarian cancer Neg Hx      OB History    Para Term  AB Living   2         2   SAB TAB Ectopic Multiple Live Births                  # Outcome Date GA Lbr Antoine/2nd Weight Sex Delivery Anes PTL Lv   2             1                        Current Outpatient Prescriptions   Medication Sig Dispense Refill    FLUVIRIN [id number] 45 mcg (15 mcg x 3)/0.5 mL Susp       hydrocodone-acetaminophen 5-325mg (NORCO) 5-325 mg per  tablet Take 1 tablet by mouth every 6 (six) hours as needed for Pain. 16 tablet 0    inhalation device (AEROCHAMBER PLUS FLOW-VU) Use with inhaler dispense with mouthpiece 1 Device 1    norethindrone ac-eth estradiol (LOESTRIN 1.5/30, 21,) 1.5-30 mg-mcg Tab Take 1 tablet by mouth once daily. 28 each 1    norethindrone ac-eth estradiol (LOESTRIN 1.5/30, 21,) 1.5-30 mg-mcg Tab Take 1 tablet by mouth once daily. 28 each 12     No current facility-administered medications for this visit.      Allergies: Patient has no known allergies.     The ASCVD Risk score (Paulo DC Jr., et al., 2013) failed to calculate for the following reasons:    The 2013 ASCVD risk score is only valid for ages 40 to 79      ROS:  Constitutional: no weight loss, weight gain, fever, fatigue  Eyes:  No vision changes, glasses/contacts  ENT/Mouth: No ulcers, sinus problems, ears ringing, headache  Cardiovascular: No inability to lie flat, chest pain, exercise intolerance, swelling, heart palpitations  Respiratory: No wheezing, coughing blood, shortness of breath, or cough  Gastrointestinal: No diarrhea, bloody stool, nausea/vomiting, constipation, gas, hemorrhoids  Genitourinary: No blood in urine, painful urination, urgency of urination, frequency of urination, incomplete emptying, incontinence, abnormal bleeding,+ painful periods, +heavy periods, vaginal discharge, vaginal odor, painful intercourse, sexual problems, bleeding after intercourse.  Musculoskeletal: No muscle weakness  Skin/Breast: No painful breasts, nipple discharge, masses, rash, ulcers  Neurological: No passing out, seizures, numbness, headache  Endocrine: No diabetes, hypothyroid, hyperthyroid, hot flashes, hair loss, abnormal hair growth, acne  Psychiatric: No depression, crying  Hematologic: No bruises, bleeding, swollen lymph nodes, anemia.      Physical Exam:   Constitutional: She is oriented to person, place, and time. She appears well-developed and well-nourished.       Neck: Normal range of motion. No tracheal deviation present. No thyromegaly present.    Cardiovascular: Exam reveals no edema.     Pulmonary/Chest: Effort normal. She exhibits no mass, no tenderness, no deformity and no retraction. Right breast exhibits no inverted nipple, no mass, no nipple discharge, no skin change, no tenderness, presence, no bleeding and no swelling. Left breast exhibits no inverted nipple, no mass, no nipple discharge, no skin change, no tenderness, presence, no bleeding and no swelling. Breasts are symmetrical.        Abdominal: Soft. She exhibits no distension and no mass. There is no tenderness. There is no rebound and no guarding. No hernia. Hernia confirmed negative in the left inguinal area.     Genitourinary: Vagina normal and uterus normal. Rectal exam shows no external hemorrhoid. There is no rash, tenderness or lesion on the right labia. There is no rash, tenderness or lesion on the left labia. Uterus is not deviated. Cervix is normal. No no adexnal prolapse. Right adnexum displays no mass, no tenderness and no fullness. Left adnexum displays no mass, no tenderness and no fullness. No tenderness, bleeding, rectocele, cystocele or unspecified prolapse of vaginal walls in the vagina. No vaginal discharge found. Cervix exhibits no motion tenderness, no discharge and no friability.           Musculoskeletal: Normal range of motion and moves all extremeties. She exhibits no edema.      Lymphadenopathy:        Right: No inguinal adenopathy present.        Left: No inguinal adenopathy present.    Neurological: She is alert and oriented to person, place, and time.    Skin: No rash noted. No erythema. No pallor.    Psychiatric: She has a normal mood and affect. Her behavior is normal. Judgment and thought content normal.         ASSESSMENT:   well woman    PLAN:   pap smear  return annually or prn  Counseled patient on timed coitus  Will check labs

## 2018-05-24 ENCOUNTER — PATIENT MESSAGE (OUTPATIENT)
Dept: OBSTETRICS AND GYNECOLOGY | Facility: CLINIC | Age: 33
End: 2018-05-24

## 2018-05-29 LAB — MIS SERPL-MCNC: 2.7 NG/ML (ref 0.9–9.5)

## 2018-06-07 ENCOUNTER — PATIENT MESSAGE (OUTPATIENT)
Dept: OBSTETRICS AND GYNECOLOGY | Facility: CLINIC | Age: 33
End: 2018-06-07

## 2018-06-27 ENCOUNTER — LAB VISIT (OUTPATIENT)
Dept: LAB | Facility: OTHER | Age: 33
End: 2018-06-27
Attending: OBSTETRICS & GYNECOLOGY
Payer: COMMERCIAL

## 2018-06-27 DIAGNOSIS — N92.6 IRREGULAR MENSES: ICD-10-CM

## 2018-06-27 LAB — PROGEST SERPL-MCNC: 16.6 NG/ML

## 2018-06-27 PROCEDURE — 36415 COLL VENOUS BLD VENIPUNCTURE: CPT

## 2018-06-27 PROCEDURE — 84144 ASSAY OF PROGESTERONE: CPT

## 2018-06-28 ENCOUNTER — PATIENT MESSAGE (OUTPATIENT)
Dept: OBSTETRICS AND GYNECOLOGY | Facility: CLINIC | Age: 33
End: 2018-06-28

## 2018-07-02 RX ORDER — LEVOTHYROXINE SODIUM 25 UG/1
25 TABLET ORAL DAILY
Qty: 30 TABLET | Refills: 11 | Status: SHIPPED | OUTPATIENT
Start: 2018-07-02 | End: 2021-07-14

## 2018-09-04 NOTE — PROGRESS NOTES
Subjective:      Patient ID: Chayo Williamson is a 32 y.o. female.    Chief Complaint: Neck Pain    Referred by: SelfOniel     Ms Williamson is a 33 yo female here for evaluation of neck pain.  She is here in soft neck collar.  She has had neck pain for over a year.  She will wake up and could not move.  She feels like she will randomly move and cannot move her neck.  She has had 6-7 episodes and she will wear neck brace and will get better in 2-3 weeks.  This episode started 3 days ago.  The pain is constant during the flare.  The pain will be tolerable with neck brace and no moving.  The pain is with turning head right to left and with looking down.  The pain is on both sides of the neck and to both shoulders.  There is no arm pain.  She was told by urgent care to wear neck brace and not move neck.  The pain is a throbbing pain.  There is no numbness and no tingling.  She has taken one ibuprofen , she has taken aleve 2 Am and 2 PM.  She has had norco and tramadol.  She has not had any muscle relaxers.  She went to one PT visit, and she was doing ok.  Pain is 2/10 now, worst 10/10 waking up and trying to lift head off pillow, best 0/10 when not in flare    X-ray cervical 2016  4 views: Alignment is normal. There is no significant DJD. Odontoid and prevertebral soft tissues are normal. No instability seen.    Impression     No abnormality seen.      No past medical history on file.    Past Surgical History:  No date:  SECTION      Comment:   X 2    Review of patient's family history indicates:  Problem: Breast cancer      Relation: Mother          Age of Onset: 45  Problem: Hypertension      Relation: Maternal Grandmother          Age of Onset: (Not Specified)  Problem: Stroke      Relation: Maternal Grandmother          Age of Onset: (Not Specified)  Problem: Diabetes      Relation: Maternal Grandmother          Age of Onset: (Not Specified)  Problem: Breast cancer      Relation:  Other          Age of Onset: (Not Specified)  Problem: Colon cancer      Relation: Paternal Aunt          Age of Onset: (Not Specified)  Problem: Ovarian cancer      Relation: Neg Hx          Age of Onset: (Not Specified)      Social History    Socioeconomic History      Marital status:       Spouse name: Not on file      Number of children: 2      Years of education: Not on file      Highest education level: Not on file    Social Needs      Financial resource strain: Not on file      Food insecurity - worry: Not on file      Food insecurity - inability: Not on file      Transportation needs - medical: Not on file      Transportation needs - non-medical: Not on file    Occupational History      Occupation:         Employer: [employer]    Tobacco Use      Smoking status: Never Smoker      Smokeless tobacco: Never Used    Substance and Sexual Activity      Alcohol use: Yes        Comment: social      Drug use: No      Sexual activity: Yes        Partners: Male        Birth control/protection: Condom, OCP    Other Topics      Concerns:        Not on file    Social History Narrative      Not on file      Current Outpatient Medications:  FLUVIRIN 2893-0792 45 mcg (15 mcg x 3)/0.5 mL Susp, , Disp: , Rfl:   hydrocodone-acetaminophen 5-325mg (NORCO) 5-325 mg per tablet, Take 1 tablet by mouth every 6 (six) hours as needed for Pain., Disp: 16 tablet, Rfl: 0  inhalation device (AEROCHAMBER PLUS FLOW-VU), Use with inhaler dispense with mouthpiece, Disp: 1 Device, Rfl: 1  levothyroxine (SYNTHROID) 25 MCG tablet, Take 1 tablet (25 mcg total) by mouth once daily., Disp: 30 tablet, Rfl: 11  norethindrone ac-eth estradiol (LOESTRIN 1.5/30, 21,) 1.5-30 mg-mcg Tab, Take 1 tablet by mouth once daily., Disp: 28 each, Rfl: 1  norethindrone ac-eth estradiol (LOESTRIN 1.5/30, 21,) 1.5-30 mg-mcg Tab, Take 1 tablet by mouth once daily., Disp: 28 each, Rfl: 12    No current facility-administered  medications for this visit.       Review of patient's allergies indicates:  No Known Allergies                Review of Systems   Constitution: Negative for weight gain and weight loss.   Cardiovascular: Negative for chest pain.   Respiratory: Negative for shortness of breath.    Musculoskeletal: Positive for neck pain. Negative for joint pain and joint swelling.   Gastrointestinal: Negative for abdominal pain and bowel incontinence.   Genitourinary: Negative for bladder incontinence.   Neurological: Negative for numbness.           Objective:          General    Vitals reviewed.  Constitutional: She is oriented to person, place, and time. She appears well-developed and well-nourished.   HENT:   Head: Normocephalic and atraumatic.   Pulmonary/Chest: Effort normal.   Neurological: She is alert and oriented to person, place, and time.   Psychiatric: She has a normal mood and affect. Her behavior is normal. Judgment and thought content normal.     General Musculoskeletal Exam   Gait: normal     Right Ankle/Foot Exam     Tests   Heel Walk: able to perform  Tiptoe Walk: able to perform    Left Ankle/Foot Exam     Tests   Heel Walk: able to perform  Tiptoe Walk: able to perform  Back (L-Spine & T-Spine) / Neck (C-Spine) Exam     Tenderness   The patient is tender to palpation of the right trapezial and left trapezial. Right paramedian tenderness of the Upper C-Spine and Lower C-Spine. Left paramedian tenderness of the Upper C-Spine and Lower C-Spine.     Neck (C-Spine) Range of Motion   Flexion:      Limited 10  Extension:  Limited 10Right Lateral Bend: 5 Left Lateral Bend: 5 Right Rotation: 10 Left Rotation: 10     Spinal Sensation   Right Side Sensation  C-Spine Level: normal   L-Spine Level: normal  S-Spine Level: normal  Left Side Sensation  C-Spine Level: normal  L-Spine Level: normal  S-Spine Level: normal    Back (L-Spine & T-Spine) Tests   Right Side Tests  Straight leg raise:      Sitting SLR: > 70  degrees      Left Side Tests  Straight leg raise:     Sitting SLR: > 70 degrees          Other She has no scoliosis .  Spinal Kyphosis:  Absent    Comments:  Wearing soft neck brace and afraid to move the neck, diffuse muscle tenderness      Muscle Strength   Right Upper Extremity   Biceps: 5/5/5   Deltoid:  5/5  Triceps:  5/5  Wrist extension: 5/5/5   Finger Flexors:  5/5  Left Upper Extremity  Biceps: 5/5/5   Deltoid:  5/5  Triceps:  5/5  Wrist extension: 5/5/5   Finger Flexors:  5/5  Right Lower Extremity   Hip Flexion: 5/5   Quadriceps:  5/5   Anterior tibial:  5/5/5  EHL:  5/5  Left Lower Extremity   Hip Flexion: 5/5   Quadriceps:  5/5   Anterior tibial:  5/5/5   EHL:  5/5    Reflexes     Left Side  Biceps:  2+  Triceps:  2+  Brachioradialis:  2+  Quadriceps:  2+  Achilles:  2+  Left Trinidad's Sign:  Absent  Babinski Sign:  absent    Right Side   Biceps:  2+  Triceps:  2+  Brachioradialis:  2+  Quadriceps:  2+  Achilles:  2+  Right Trinidad's Sign:  absent  Babinski Sign:  absent    Vascular Exam     Right Pulses        Carotid:                  2+    Left Pulses        Carotid:                  2+        Assessment:       Encounter Diagnoses   Name Primary?    Muscle spasm Yes    Neck pain          Plan:       Chayo was seen today for neck pain.    Diagnoses and all orders for this visit:    Muscle spasm  -     Ambulatory consult to [facility]    Neck pain  -     Ambulatory consult to [facility]    Other orders  -     methocarbamol (ROBAXIN) 750 MG Tab; Take 1 tablet (750 mg total) by mouth 3 (three) times daily.  -     diclofenac (VOLTAREN) 75 MG EC tablet; Take 1 tablet (75 mg total) by mouth 2 (two) times daily.         More than 50% of the total time of 45 minutes was spent in counseling on diagnosis and treatment options. We discussed neck pain and the nature of neck pain.  I reviewed her x-rays of the cervical spine from 2016 which show no significant DJD and good alignment.  We  discussed the soft collar and not moving her neck.  Discussed that makes her stiffer and weaker.  We discussed that it is not one thing that causes the pain but an accumulation of multiple things that we do.  We discussed posture sitting and the importance of trying to sit better.  We discussed sitting with head above spine.  We discussed watching posture on phone.    We discussed the benefits of therapy and exercise and continuing to move.    1.  PT for progressive resistance exercise pattern 1 neck at [facility]  2.  Robaxin 750mg po TID  3.  Diclofenac 75mg po BID  4.  Reviewed x-ray and improtance to move and not hold head stiff  5.  Discussed neck brace and weaning out of the brace and continuing to use the neck  6.  RTC 3 months

## 2018-09-05 ENCOUNTER — OFFICE VISIT (OUTPATIENT)
Dept: SPINE | Facility: CLINIC | Age: 33
End: 2018-09-05
Attending: PHYSICAL MEDICINE & REHABILITATION
Payer: COMMERCIAL

## 2018-09-05 VITALS — HEIGHT: 59 IN | BODY MASS INDEX: 26.21 KG/M2 | WEIGHT: 130 LBS

## 2018-09-05 DIAGNOSIS — M62.838 MUSCLE SPASM: Primary | ICD-10-CM

## 2018-09-05 DIAGNOSIS — M54.2 NECK PAIN: ICD-10-CM

## 2018-09-05 PROCEDURE — 99204 OFFICE O/P NEW MOD 45 MIN: CPT | Mod: S$GLB,,, | Performed by: PHYSICAL MEDICINE & REHABILITATION

## 2018-09-05 PROCEDURE — 99999 PR PBB SHADOW E&M-EST. PATIENT-LVL III: CPT | Mod: PBBFAC,,, | Performed by: PHYSICAL MEDICINE & REHABILITATION

## 2018-09-05 PROCEDURE — 3008F BODY MASS INDEX DOCD: CPT | Mod: CPTII,S$GLB,, | Performed by: PHYSICAL MEDICINE & REHABILITATION

## 2018-09-05 RX ORDER — DICLOFENAC SODIUM 75 MG/1
75 TABLET, DELAYED RELEASE ORAL 2 TIMES DAILY
Qty: 60 TABLET | Refills: 2 | Status: SHIPPED | OUTPATIENT
Start: 2018-09-05 | End: 2019-08-22

## 2018-09-05 RX ORDER — METHOCARBAMOL 750 MG/1
750 TABLET, FILM COATED ORAL 3 TIMES DAILY
Qty: 90 TABLET | Refills: 0 | Status: SHIPPED | OUTPATIENT
Start: 2018-09-05 | End: 2019-08-22

## 2018-10-02 ENCOUNTER — CLINICAL SUPPORT (OUTPATIENT)
Dept: REHABILITATION | Facility: OTHER | Age: 33
End: 2018-10-02
Attending: PHYSICAL MEDICINE & REHABILITATION
Payer: COMMERCIAL

## 2018-10-02 DIAGNOSIS — Z74.09 MOBILITY IMPAIRED: ICD-10-CM

## 2018-10-02 DIAGNOSIS — M54.2 PAINFUL CERVICAL ROM: ICD-10-CM

## 2018-10-02 PROCEDURE — 97161 PT EVAL LOW COMPLEX 20 MIN: CPT

## 2018-10-02 PROCEDURE — 97110 THERAPEUTIC EXERCISES: CPT

## 2018-10-03 NOTE — PLAN OF CARE
[facility] - PHYSICAL THERAPY EVALUATION     Name: Chayo Williamson  Clinic Number: [id number]      Diagnosis:   Encounter Diagnoses   Name Primary?    Mobility impaired     Painful cervical ROM         Medical Diagnosis:  M62.838 (ICD-10-CM) - Muscle spasm   M54.2 (ICD-10-CM) - Neck pain       Physician: Darlene Montanez, *     Treatment Orders: PT Eval and Treat    No past medical history on file.  Current Outpatient Medications   Medication Sig    diclofenac (VOLTAREN) 75 MG EC tablet Take 1 tablet (75 mg total) by mouth 2 (two) times daily.    FLUVIRIN 6553-6142 45 mcg (15 mcg x 3)/0.5 mL Susp     levothyroxine (SYNTHROID) 25 MCG tablet Take 1 tablet (25 mcg total) by mouth once daily.    methocarbamol (ROBAXIN) 750 MG Tab Take 1 tablet (750 mg total) by mouth 3 (three) times daily.     No current facility-administered medications for this visit.      Review of patient's allergies indicates:  No Known Allergies    Precautions: Standard.     Pattern of pain determined: 1 SAMMY    Evaluation Date: 10/2/2018     one Darlene Montanez MD [facility]   Priority Start Date Expiration Date Referral Entered By   Routine 09/26/2018 12/31/2018 Darlene Montanez MD   Visits Requested Visits Authorized Visits Completed Visits Scheduled   12 20 1       Plan of Care Expiration: 1/2/19  Reassessment Due: 11/2/18  Visit # / Visits authorized: 1/ 20    Time In: 1320  Time Out: 1430  Total Billable Time: 70 minutes     HISTORY     History of Present Illness: Patient reports neck pain that has been going for 2 years. She has pain and difficulty looking left and right while driving sometimes. Patient has flare ups, and using the neck brace improves pain. She does not not have any pain today. Her flare ups are random by nature. She does not know of any patterns that could cause. It first started when  She woke up one day, and could not move her neck. She reports having pain  when flared up, down to her shoulders and between her shoulder blades. She denies any Family Hx of MS, RA, Fibromyalgia. She had past last, about a month ago. Patient is a  for a  [employer] for [employer] organization, and sits at a desk/computer. Patient reports that she sits at her desk with shoulders elevated.     MD's Notes:  Ms Williamson is a 31 yo female here for evaluation of neck pain.  She is here in soft neck collar.  She has had neck pain for over a year.  She will wake up and could not move.  She feels like she will randomly move and cannot move her neck.  She has had 6-7 episodes and she will wear neck brace and will get better in 2-3 weeks.  This episode started 3 days ago.  The pain is constant during the flare.  The pain will be tolerable with neck brace and no moving.  The pain is with turning head right to left and with looking down. The pain is on both sides of the neck and to both shoulders.  There is no arm pain.  She was told by urgent care to wear neck brace and not move neck.  The pain is a throbbing pain.  There is no numbness and no tingling.  She has taken one ibuprofen Sunday, she has taken aleve 2 Am and 2 PM.  She has had norco and tramadol.  She has not had any muscle relaxers.  She went to one PT visit, and she was doing ok.  Pain is 2/10 now, worst 10/10 waking up and trying to lift head off pillow, best 0/10 when not in flare.  (Wearing soft neck brace and afraid to move the neck, diffuse muscle tenderness).    Diagnostic Tests:   X-ray cervical 2016  4 views: Alignment is normal. There is no significant DJD. Odontoid and prevertebral soft tissues are normal. No instability seen.     Impression                    No abnormality seen.     Pain Scale: Chayo rates pain on a scale of 0-10 to be 10 at worst; 0 currently; 0 at best using VAS.   Pain location: Neck and shoulders    Aggravating factors: unknown cause; but movement makes pain worst  Easing Factors: not movement, neck collar; pain  "medication;   Disturbed Sleep: Yes    Pattern of pain questions:  1.  Where is your pain the worst? Neck shoulders  2.  Is your pain constant or intermittent? Constant when flared up  3.  Does bending forward make your typical pain worse? Bending fwd worst; pain with rotation as well.  4.  Since the start of your back pain, has there been a change in your bowel or bladder? No  5.  What can't you do now that you use to be able to do? Move neck and physical activities; driving    Prior Treatment: Yes, but incomplete (1-2 sessions)  Prior functional status: Unlimited and no pain with activities.  DME owned/used: NA  Occupation:   for non [employer].   Leisure: Run,  Play with her kids.                  Pts goals:  Not have pain, free movement w/o pain; return to full activities w/o limitations by pain.    Red Flag Screening:   Cough  Sneeze  Strain: (+)  Bladder/ bowel: (--)  Falls: (--)  Night pain: (+) Same as the day pain; nothing different.  Unexplained weight loss: (--)  Swallowing: (--)  Dizziness: (--)  General health: "Pretty good".    OBJECTIVE     Postural examination/scapula alignment: Rounded shoulder, Head forward and Slouched posture; Increased spine lordosis    Correction of posture: better with lumbar roll  (Half roll)    Gross Cervical spine  Range of Motion - WNL with all movements (No pain)  B UE's  AROM: All WNL B    Upper Extremity Strength: all B UE's MMT 5/5    NEUROLOGICAL SCREEN    Sensory deficit: All sensation normal    Special Tests:   Test Name  Testing Result   Compression (--)   Distraction (--)   Neural Tension Test (--)   Saddle Sensation DNT     Reflexes:    Left Right   Biceps  2+ 2+   Brachioradialis  2+ 2+   Clonus (--) (--)   Babinski (--) (--)       REPEATED TEST MOVEMENTS:    Repeated Protraction in Sitting no effect   Repeated Flexion in Sitting no effect   Repeated Retraction in Sitting  no effect   Repeated Retraction Extension in Sitting no effect   Repeated Protraction in " lying no effect   Repeated Flexion in lying no effect   Repeated Retraction in lying no effect   Repeated Retraction Extension in lying no effect       Baseline Isometric Testing on Med X equipment:  Testing administered by PT  Date of testing: 10/2/18  ROM 12-96 deg   Max Peak Torque 161    Min Peak Torque 54   Flex/Ext Ratio 2.98/1   % below normative data 46   Counter weight 0.8   Seat adjustment 332   Seat pad 3       GAIT:  Assistive Device used: none  Level of Assistance: independent  Patient displays the following gait deviations:  no gait deviations observed.       CMS Impairment/Limitation/Restriction for FOTO Neck Survey    Therapist reviewed FOTO scores for Chayo Williamson on 10/2/2018.   FOTO documents entered into [employer] - see Media section.    Limitation Score: 72% (When In pain)  Category: Mobility    Current : CL = least 60% but < 80% impaired, limited or restricted  Goal: CK = at least 40% but < 60% impaired, limited or restricted 48%  Discharge:            Treatment       PT Evaluation Completed? Yes  Discussed Plan of Care with patient: Yes      Home Exercise Program as follows:   Handouts were given to the patient. Pt demo fair to good understanding of the education provided. Chayo demonstrated fair to good return demonstration of activities.     - Patient received education regarding proper posture and body mechanics.   Patient was given top 10 tips handout which discusses posture seated, standing, lifting correctly, components of exercise, importance of nutrition and hydration, and importance of sleep.    - Ivan roll tried, recommended, and purchase information was provided.    - Patient received a handout regarding anticipated muscular soreness following the isometric test and strategies for management were reviewed with patient including stretching, using ice and scheduled rest.     HEP:  Patient was instructed in using lumbar roll at work, and correct ergonomics at work.  Scap  retractions 2x 10 reps 2-3x/day  AROM in all pain free ranges throughout day.    Pt was instructed in and performed the following:   Cardiovascular exercise and therapeutic exercise to improve posture, lumbar/cervical ROM, strength, and muscular endurance as follows:     Chayo received therapeutic exercises to develop/improve posture, lumbar/cervical ROM, strength and muscular endurance for 30 minutes including the following exercises:     [facility] 10/2/2018   Visit Number 1   VAS Pain Rating 0   Scapular Retraction 10   Cervical Extension Seat Pad 3   Seat Adjustment 332   Top Dead Center 66   Counterweight 0.8   Cervical Flexion 96   Cervical Extension 12   Cervical Peak Torque 161   Cervical Weight 80   Repetitions 0   Rating of Perceived Exertion 0   Ice - Z Lie (in min.) 10         Chayo received the following manual therapy techniques: Joint mobilizations, Manual traction, Myofacial release, Manual Lymphatic Drainage and Soft tissue Mobilization were applied to the: Cervico-thoracic region for 00 minutes.     Assessment     This is a 33 y.o. female referred to [facility] and presents with a medical diagnosis of Neck pain and muscle spasm and demonstrates limitations as described below in the problem list. Pt rehab potential is Good. Pt presents today with pain-free and WNL C-Spine AROM and MMT UE's strength 5/5. Patient has random flare ups of neck and shoulder pain. All C-spine pain provocation tests were completed and negative today for nerve root and other possible spinal irritation. Imaging was negative for abnormality (2016); she may require a new imaging to r/o additional spinal abnormalities. She does not have any red flag symptoms at this time, but her condition is very puzzling. Patient has a desk job (), and her posture and alignment may be causing her neck/shoulder pain. She was educated on proper ergonomics and postural alignment in sitting and sleeping. Upon test for  cervical spine extensor strength, patient was 46% below normative data, with a peak torque of 161% and min torque of 54%. Her responses to assessments today categorized as a 1 SAMMY condition. FOTO assessment revealed.    Pain Pattern: 1 SAMMY    Patient received education on the [facility] program, purpose of the isometric test, progression of back strengthening as well as wellness approach and systemic strengthening.  Details of the program were discussed.  Reviewed that patient should feel support/pressure from med ex restraints but no pain or discomfort and patient expressed understanding.    Based on the above history and physical examination an active physical therapy program is recommended.  Pt will continue to benefit from skilled outpatient physical therapy to address the deficits listed below in the chart, provide pt/family education and to maximize pt's level of independence in the home and community environment. .     No environmental, cultural, spiritual, developmental or education needs expressed or noted    Medical necessity is demonstrated by the following problem list.    Pt presents with the following impairments:     History  Co-morbidities and personal factors that may impact the plan of care Co-morbidities:   young age, no significant co-morb.    Personal Factors:   age     low   Examination  Body Structures and Functions, activity limitations and participation restrictions that may impact the plan of care Body Regions:   neck  upper extremities    Body Systems:    ROM  strength  transitions    Participation Restrictions:   NA    Activity limitations:   Learning and applying knowledge  no deficits    General Tasks and Commands  no deficits    Communication  no deficits    Mobility  lifting and carrying objects  walking  driving (bike, car, motorcycle)    Self care  washing oneself (bathing, drying, washing hands)  caring for body parts (brushing teeth, shaving,  grooming)  toileting  dressing  looking after one's health    Domestic Life  shopping  cooking  doing house work (cleaning house, washing dishes, laundry)  assisting others  no deficits    Interactions/Relationships  no deficits    Life Areas  no deficits    Community and Social Life  recreation and leisure         low   Clinical Presentation stable and uncomplicated low   Decision Making/ Complexity Score: low       GOALS: Pt is in agreement with the following goals.    Short term goals: 6 weeks or 10 visits   1.  Pt will demonstrate increased isometric torque by 5% from initial test indicating positive muscular response to program of progressive resistance training  2. Pt will demonstrate reduced pain and improved functional outcomes as reported on the patient centered questionnaires. Report 2-4 points reduction NDI indicating improvement in function and pain  3.. Pt will demonstrate independence with reducing or controlling symptoms with ther ex, movement, or position independently, able to reduce pain 1-2 points on pain scale using strategies taught in therapy  4. Pt will demonstrate increased maximum isometric torque value by 10% when compared to the initial value resulting in improved ability to perform bending, lifting, and carrying activities safely, confidently.        Long term goals: 13 weeks or 20 visits  1. Pt will demonstratte increased cervical ROM as measured by med ex by 6 degrees from initial test which results in full functional ROM of neck for ease with ADLs and driving  2. Pt will demonstrate increased isometric torque by 10% from initial test to improve ability to lift and carry.   3.Patient will demonstrate improved overall function per FOTO Survey to CK = at least 40% but < 60% impaired, limited or restricted score or less.   4. Pt will demonstrate independence with reducing or controlling symptoms with ther ex, movement, or position independently, able to reduce pain 2-4 points on pain scale  "using strategies taught in therapy  5. Pt will demonstrate independence with the HEP at discharge.     Plan   Outpatient physical therapy 2x week for 13 weeks or 20 visits to include the following:   - Patient education  - Therapeutic exercise  - Manual therapy  - Performance testing   - Neuromuscular Re-education  - Therapeutic activity   - Modalities    Pt may be seen by PTA as part of the rehabilitation team.     Therapist: Mony cMkeon, PT  10/2/2018      "I certify the need for these services furnished under this plan of treatment and while under my care."    ____________________________________  Physician/Referring Practitioner    _______________  Date of Signature    "

## 2018-11-09 ENCOUNTER — DOCUMENTATION ONLY (OUTPATIENT)
Dept: REHABILITATION | Facility: OTHER | Age: 33
End: 2018-11-09

## 2018-11-09 PROBLEM — Z74.09 MOBILITY IMPAIRED: Status: RESOLVED | Noted: 2018-10-02 | Resolved: 2018-11-09

## 2018-11-09 PROBLEM — M54.2 PAINFUL CERVICAL ROM: Status: RESOLVED | Noted: 2018-10-02 | Resolved: 2018-11-09

## 2018-12-11 ENCOUNTER — PATIENT MESSAGE (OUTPATIENT)
Dept: OBSTETRICS AND GYNECOLOGY | Facility: CLINIC | Age: 33
End: 2018-12-11

## 2018-12-12 RX ORDER — NORETHINDRONE ACETATE AND ETHINYL ESTRADIOL .03; 1.5 MG/1; MG/1
1 TABLET ORAL DAILY
Qty: 28 EACH | Refills: 6 | Status: SHIPPED | OUTPATIENT
Start: 2018-12-12 | End: 2019-06-20 | Stop reason: SDUPTHER

## 2018-12-12 NOTE — TELEPHONE ENCOUNTER
Pt calling to start on OCPs again. Please send in prescription. Pt wants to go back on what she was on before

## 2019-04-10 ENCOUNTER — PATIENT MESSAGE (OUTPATIENT)
Dept: OBSTETRICS AND GYNECOLOGY | Facility: CLINIC | Age: 34
End: 2019-04-10

## 2019-05-17 ENCOUNTER — PATIENT MESSAGE (OUTPATIENT)
Dept: OBSTETRICS AND GYNECOLOGY | Facility: CLINIC | Age: 34
End: 2019-05-17

## 2019-06-20 RX ORDER — NORETHINDRONE ACETATE AND ETHINYL ESTRADIOL 1.5; 3 MG/1; UG/1
TABLET ORAL
Qty: 28 EACH | Refills: 11 | Status: SHIPPED | OUTPATIENT
Start: 2019-06-20 | End: 2019-08-22

## 2019-08-22 ENCOUNTER — OFFICE VISIT (OUTPATIENT)
Dept: PRIMARY CARE CLINIC | Facility: CLINIC | Age: 34
End: 2019-08-22
Payer: COMMERCIAL

## 2019-08-22 VITALS
TEMPERATURE: 98 F | HEIGHT: 59 IN | SYSTOLIC BLOOD PRESSURE: 110 MMHG | DIASTOLIC BLOOD PRESSURE: 64 MMHG | HEART RATE: 61 BPM | WEIGHT: 131.63 LBS | BODY MASS INDEX: 26.54 KG/M2

## 2019-08-22 DIAGNOSIS — Z01.89 ENCOUNTER FOR ROUTINE LABORATORY TESTING: ICD-10-CM

## 2019-08-22 DIAGNOSIS — Z13.220 SCREENING CHOLESTEROL LEVEL: ICD-10-CM

## 2019-08-22 DIAGNOSIS — Z11.3 SCREEN FOR STD (SEXUALLY TRANSMITTED DISEASE): ICD-10-CM

## 2019-08-22 DIAGNOSIS — Z00.00 ANNUAL PHYSICAL EXAM: Primary | ICD-10-CM

## 2019-08-22 DIAGNOSIS — Z23 NEED FOR TDAP VACCINATION: ICD-10-CM

## 2019-08-22 DIAGNOSIS — L21.9 SEBORRHEIC DERMATITIS OF SCALP: ICD-10-CM

## 2019-08-22 DIAGNOSIS — G43.009 MIGRAINE WITHOUT AURA AND WITHOUT STATUS MIGRAINOSUS, NOT INTRACTABLE: ICD-10-CM

## 2019-08-22 PROCEDURE — 99999 PR PBB SHADOW E&M-EST. PATIENT-LVL IV: CPT | Mod: PBBFAC,,, | Performed by: FAMILY MEDICINE

## 2019-08-22 PROCEDURE — 90471 TDAP VACCINE GREATER THAN OR EQUAL TO 7YO IM: ICD-10-PCS | Mod: S$GLB,,, | Performed by: FAMILY MEDICINE

## 2019-08-22 PROCEDURE — 99395 PR PREVENTIVE VISIT,EST,18-39: ICD-10-PCS | Mod: 25,S$GLB,, | Performed by: FAMILY MEDICINE

## 2019-08-22 PROCEDURE — 99395 PREV VISIT EST AGE 18-39: CPT | Mod: 25,S$GLB,, | Performed by: FAMILY MEDICINE

## 2019-08-22 PROCEDURE — 90471 IMMUNIZATION ADMIN: CPT | Mod: S$GLB,,, | Performed by: FAMILY MEDICINE

## 2019-08-22 PROCEDURE — 90715 TDAP VACCINE 7 YRS/> IM: CPT | Mod: S$GLB,,, | Performed by: FAMILY MEDICINE

## 2019-08-22 PROCEDURE — 99999 PR PBB SHADOW E&M-EST. PATIENT-LVL IV: ICD-10-PCS | Mod: PBBFAC,,, | Performed by: FAMILY MEDICINE

## 2019-08-22 PROCEDURE — 90715 TDAP VACCINE GREATER THAN OR EQUAL TO 7YO IM: ICD-10-PCS | Mod: S$GLB,,, | Performed by: FAMILY MEDICINE

## 2019-08-22 RX ORDER — KETOCONAZOLE 20 MG/ML
SHAMPOO, SUSPENSION TOPICAL
Refills: 3 | COMMUNITY
Start: 2019-06-21 | End: 2021-08-09

## 2019-08-22 RX ORDER — SUMATRIPTAN SUCCINATE 25 MG/1
25 TABLET ORAL ONCE AS NEEDED
Qty: 27 TABLET | Refills: 0 | Status: SHIPPED | OUTPATIENT
Start: 2019-08-22 | End: 2021-08-09

## 2019-08-22 NOTE — PROGRESS NOTES
"Subjective:       Patient ID: Chayo Williamson is a 33 y.o. female.    Chief Complaint: Establish Care; Annual Exam; and Migraine    32 yo female presents for annual physical and has an additional complaint of migraine headache.    She is no longer taking Synthroid and states that this medication was given to her for "fertility". Normal TSH in 2006 and 2018. She is not currently on this medication.    She has experienced occasional migraine headaches but for the past 1 month her headaches are more frequent, occurring almost daily although she doesn't have a headache today. She has associated photophobia, phonophobia and lightheadedness. She cannot tell when her headaches are going to occur. Her last headache was yesterday; she went to bed with the headache and awoke without the headache. She reports stress. No change in alcohol or coffee intake. Stopped taking birth control 2 months ago and planning on getting pregnant.    Location: Left temporal mostly; Sometimes frontal  Quality: sharp   Severity: Not present now, max 8  Duration: from 4-24 hrs  Onset: daily for 1 month, but no headache today  Description: feels like someone sitting on face/head, lots of pressure  Alleviating factors: pressing sides of head, dark and quite room, rest, tylenol, advil  Aggravating factors: light and noise  No identifiable triggers.  Associating factors: nausea, lightheadedness, foggy vision  No vomiting. No fever. No neurological deficits.      She was [facility] due to no shows on November 9, 2019  Physical medicine and rehabilitation last visit on 9/05/2018 for neck pain. Given methocarbamol and diclofenac.  Referred to [facility].  Lost to follow-up despite advice to return in 3 months.   Last gynecology visit 5/23/2018 and she plans to make a return appointment.    The following portions of the patient's history were reviewed and updated as appropriate: allergies, current medications, past " family history, past medical history, past social history, past surgical history and problem list.    Review of Systems   Constitutional: Positive for activity change and unexpected weight change. Negative for appetite change, chills, diaphoresis, fatigue and fever.   HENT: Negative for congestion, dental problem, facial swelling, hearing loss, nosebleeds, postnasal drip, rhinorrhea, sinus pain, sore throat, tinnitus and trouble swallowing.    Eyes: Positive for visual disturbance. Negative for pain, discharge and itching.   Respiratory: Negative for apnea, chest tightness, shortness of breath, wheezing and stridor.    Cardiovascular: Negative for chest pain, palpitations and leg swelling.   Gastrointestinal: Negative for abdominal distention, abdominal pain, blood in stool, constipation, diarrhea, nausea, rectal pain and vomiting.   Endocrine: Negative for cold intolerance, heat intolerance, polydipsia and polyuria.   Genitourinary: Negative for difficulty urinating, dysuria, frequency, hematuria, menstrual problem and urgency.   Musculoskeletal: Negative for arthralgias, gait problem, joint swelling, myalgias, neck pain and neck stiffness.   Skin: Negative for color change and rash.   Neurological: Positive for light-headedness and headaches. Negative for dizziness, tremors, seizures, syncope, facial asymmetry, speech difficulty, weakness and numbness.   Hematological: Negative for adenopathy. Does not bruise/bleed easily.   Psychiatric/Behavioral: Negative for agitation, confusion, dysphoric mood, hallucinations, self-injury and suicidal ideas. The patient is not hyperactive.        Objective:       Vitals:    08/22/19 1111   BP: 110/64   Pulse: 61   Temp: 98.4 °F (36.9 °C)     Physical Exam   Constitutional: She is oriented to person, place, and time. She appears well-developed and well-nourished. No distress.   HENT:   Head: Normocephalic and atraumatic.   Right Ear: External ear normal.   Left Ear: External  ear normal.   Mouth/Throat: No oropharyngeal exudate.   Eyes: Pupils are equal, round, and reactive to light. Conjunctivae and EOM are normal. Right eye exhibits no discharge. Left eye exhibits no discharge. No scleral icterus.   Neck: Normal range of motion. Neck supple. No thyromegaly present.   Cardiovascular: Normal rate, regular rhythm, normal heart sounds and intact distal pulses. Exam reveals no gallop and no friction rub.   No murmur heard.  Pulmonary/Chest: Effort normal and breath sounds normal. No respiratory distress. She exhibits no tenderness.   Abdominal: Soft. Bowel sounds are normal. She exhibits no distension and no mass. There is no tenderness. There is no rebound and no guarding.   Musculoskeletal: Normal range of motion. She exhibits no edema.   Lymphadenopathy:     She has no cervical adenopathy.   Neurological: She is alert and oriented to person, place, and time. She displays normal reflexes. No cranial nerve deficit or sensory deficit. She exhibits normal muscle tone. Coordination normal.   Skin: Skin is warm. Capillary refill takes less than 2 seconds. No rash noted. She is not diaphoretic.   Psychiatric: She has a normal mood and affect. Judgment and thought content normal.       Assessment:       1. Annual physical exam    2. Migraine without aura and without status migrainosus, not intractable    3. Need for Tdap vaccination    4. Screening cholesterol level    5. Encounter for routine laboratory testing    6. Screen for STD (sexually transmitted disease)    7. Seborrheic dermatitis of scalp        Plan:       1. Annual physical exam  Age appropriate prevention guidelines implemented, unless patient refused  Encourage Advanced directives  Labs ordered   Immunizations reviewed. Encourage yearly influenza vaccine, Rx was written on a hard copy script.  Patient Counseling:  --Nutrition: Encourage healthy food choices, adequate water intake, moderate sodium/caffeine intake, diet low in  saturated fat and cholesterol. Importance of caloric balance and sufficient intake of fresh fruits, vegetables, fiber, calcium, iron, and 1 mg of folate supplement per day (for females capable of pregnancy).  --Exercise: Stressed the importance of regular exercise.   --Substance Abuse: Abstinence from tobacco products. No more than 1 alcoholic drink per day in women. Avoid illicit drugs, driving or other dangerous activities under the influence.  --Sexuality: Safe sex practices to avoid sexually transmitted diseases; careful partner selection, trying to get pregnant.  --Injury prevention: encourage safety belts, safety helmets, smoke detector.  --Dental health: Discussed importance of regular tooth brushing X2 daily, flossing X1 daily, and routine dental visits.  --Encourage use of sun screen, wear sun protective clothing  --Encourage routine eye exams    2. Migraine without aura and without status migrainosus, not intractable  No personal or family history of brain aneurysms.  Headache diary.  Exercise.  CVT, relaxation and methods to reduce stress.  Discuss preventive medication for headache given that she is having daily headaches.  She is interested in an oral medication and is not interested in injectables.  Discussed the appropriate use of abortive therapy.  May continue Tylenol/nonsteroidal anti-inflammatories as tolerated.  -     propranolol (INNOPRAN XL) 80 mg 24 hr capsule; Take 1 capsule (80 mg total) by mouth every evening.  Dispense: 90 capsule; Refill: 0; the recommendations to use in pregnancy and breastfeeding were reviewed; side effects reviewed  -     sumatriptan (IMITREX) 25 MG Tab; Take 1 tablet (25 mg total) by mouth once as needed (may repeat in 1-2 hrs for max efficacy).  Dispense: 27 tablet; Refill: 0; no more than 9 in 1 month to prevent overuse headaches.  -     Follow-up primary physician; Future; Expected date: 09/22/2019 regarding medication management  Alarm symptoms requiring immediate  "re-evaluation discussed.  If symptoms fail to improve or worsen will consider MRI of brain and Neurology referral.    3. Need for Tdap vaccination  -     Tdap Vaccine    4. Screening cholesterol level  -     Lipid panel; Future; Expected date: 09/22/2019    5. Encounter for routine laboratory testing  -     CBC Without Differential; Future; Expected date: 09/22/2019; for cell count  -     Comprehensive metabolic panel; Future; Expected date: 09/22/2019; for liver, kidney and electrolyte testing. To be done fasting.  -     TSH; Future; Expected date: 09/22/2019. She is no longer taking Synthroid and states that this medication was given to her for "fertility". Normal TSH in 2006 and 2018. She is not currently on this medication.    6. Screen for STD (sexually transmitted disease) per patient's request  -     HIV 1/2 Ag/Ab (4th Gen); Future; Expected date: 09/22/2019  -     RPR; Future; Expected date: 09/22/2019  -     Vaginosis Screen by DNA Probe; Future; Expected date: 09/22/2019  -     Hepatitis panel, acute; Future; Expected date: 09/22/2019  -     C. trachomatis/N. gonorrhoeae by AMP DNA [facility]; Urine; Future; Expected date: 09/22/2019    7. Seborrheic dermatitis of scalp  She uses ketoconazole shampoo    She will return for annual physical (8/22/2020); will f/u in 1 month re: migraine headache management and wants to have all her investigative tests done that day.    Disclaimer: This note has been generated using voice-recognition software. There may be typographical errors that have been missed during proof-reading       "

## 2019-08-22 NOTE — PROGRESS NOTES
Two patient identifiers verified.  Allergies reviewed.  Tdap 0.5 ml IM administered to Left Deltoid per MD order.  Patient tolerated injection well; no redness, bleeding, or bruising noted to injection site.  Patient instructed to remain in clinic setting for 15 minutes.  Verbalizes understanding.

## 2019-08-22 NOTE — PATIENT INSTRUCTIONS
"  Self-Care for Headaches  Most headaches aren't serious and can be relieved with self-care. But some headaches may be a sign of another health problem like eye trouble or high blood pressure. To find the best treatment, learn what kind of headaches you get. For tension headaches, self-care will usually help. To treat migraines, ask your healthcare provider for advice. It is also possible to get both tension and migraine headaches. Self-care involves relieving the pain and avoiding headache triggers if you can.    Ways to reduce pain and tension  Try these steps:  · Apply a cold compress or ice pack to the pain site.  · Drink fluids. If nausea makes it hard to drink, try sucking on ice.  · Rest. Protect yourself from bright light and loud noises.  · Calm your emotions by imagining a peaceful scene.  · Massage tight neck, shoulder, and head muscles.  · To relax muscles, soak in a hot bath or use a hot shower.  Use medicines  Aspirin or aspirin substitutes, such as ibuprofen and acetaminophen, can relieve headache. Remember: Never give aspirin to anyone 18 years old or younger because of the risk of developing Reye syndrome. Use pain medicines only when necessary.  Track your headaches  Keeping a headache diary can help you and your healthcare provider identify what's causing your headaches:  · Note when each headache happens.  · Identify your activities and the foods you've eaten 6 to 8 hours before the headache began.  · Look for any trends or "triggers."  Signs of tension headache  Any of the following can be signs:  · Dull pain or feeling of pressure in a tight band around your head  · Pain in your neck or shoulders  · Headache without a definite beginning or end  · Headache after an activity such as driving or working on a computer  Signs of migraine  Any of the following can be signs:  · Throbbing pain on one or both sides of your head  · Nausea or vomiting  · Extreme sensitivity to light, sound, and " "smells  · Bright spots, flashes, or other visual changes  · Pain or nausea so severe that you can't continue your daily activities  Call your healthcare provider   If you have any of the following symptoms, contact your healthcare provider:  · A headache that lingers after a recent injury or bump to the head.  · A fever with a stiff neck or pain when you bend your head toward your chest.  · A headache along with slurred speech, changes in your vision, or numbness or weakness in your arms or legs.  · A headache for longer than 3 days.  · Frequent headaches, especially in the morning.  · Headaches with seizures   · Seek immediate medical attention if you have a headache that you would call "the worst headache you have ever had."   Date Last Reviewed: 10/4/2015  © 4664-4274 [employer]. [address] [place] [address], [place] [address]. All rights reserved. This information is not intended as a substitute for professional medical care. Always follow your healthcare professional's instructions.        Migraine Headache  This often severe type of headache is different from other types of headaches in that symptoms other than pain occur with the headache. Nausea and vomiting, lightheadedness, sensitivity to light (photophobia), and other visual disturbances are common migraine symptoms. The pain may last from a few hours to several days. It is not clear why migraines occur but certain factors called triggers can raise the risk of having a migraine attack. A migraine may be triggered by emotional stress or depression, or by hormone changes during the menstrual cycle. Other triggers include birth control pills, overuse of migraine medicines, alcohol or caffeine, foods with tyramine (such as aged cheese and wine), eyestrain, weather changes, missed meals, or too little or too much sleep.  Home care  Follow these tips when taking care of yourself at home:  · Dont drive yourself home if you were given pain medicine " for your headache or are having visual symptoms. Instead, have someone else drive you home. Try to sleep when you get home. You should feel much better when you wake up.  · Cold can help ease migraine symptoms. Put an ice pack on your forehead or at the base of your skull. Put heat on the back of your neck to help ease any neck spasm.  · Drink only clear liquids or eat a light diet until your symptoms get better. This will help you avoid nausea and vomiting.  How to prevent migraines  Pay attention to what seems to trigger your headache. Try to avoid the triggers when you can. If you have frequent headaches, consider keeping a headache diary. In it, write down what you were doing, feeling, or eating in the hours before each headache. Show this to your healthcare provider to help find the cause of your headaches.  If stress seems to be a trigger for your headaches, figure out what is causing stress in your life. Learn new ways to handle your stress. Ideas include regular exercise, biofeedback, self-hypnosis, yoga, and meditation. Talk with your healthcare provider to find out more information about managing stress. Many books and digital media are also available on this subject.  Tyramine is a substance found in many foods. It can trigger a migraine in some people. These foods contain tyramine:  · Chocolate  · Yogurt  · All cheeses, but especially aged cheeses  · Smoked or pickled fish and meat, including herring, caviar, bologna, pepperoni, and salami  · Liver  · Avocados  · Bananas  · Figs  · Raisins  · Red wine  Try staying away from these foods for 1 to 2 months to see if you have fewer headaches.  How to treat future headaches  · Take time out at the first sign of a headache, if possible. Find a quiet, dark, comfortable place to sit or lie down. Let yourself relax or sleep.  · Put an ice pack on your forehead or on the area of greatest pain. A heating pad and massage may help if you are having a muscle spasm and  tightness in your neck.  · If you have been prescribed a medicine to stop a migraine headache, use this at the first warning sign of the headache for best results. First signs may be an aura or pain.  · If you need to take medicine often for your migraine, talk with your healthcare provider about other ways to prevent your headaches.  Follow-up care  Follow up with your healthcare provider, or as advised. Talk with your provider if you have frequent headaches. He or she can figure out a treatment plan. Ask if you can have medicine to take at home the next time you get a bad headache. This may keep you from having to visit the emergency department in the future. You may need to see a headache specialist (neurologist) if you continue to have headaches.  When to seek medical advice  Call your healthcare provider right away if any of these occur:  · Your head pain gets worse, or doesnt get better within 24 hours  · You cant keep liquids down (repeated vomiting)  · Pain in your sinuses, ears, or throat  · Fever of 100.4º F (38º C) or higher, or as directed by your healthcare provider  · Stiff neck  · Extreme drowsiness, confusion, or fainting  · Dizziness, or dizziness with spinning sensation (vertigo)  · Weakness in an arm or leg, or on one side of your face  · Difficulty talking or seeing  Date Last Reviewed: 8/1/2016  © 7041-5440 [employer]. [address] [place] [address], [place] [address]. All rights reserved. This information is not intended as a substitute for professional medical care. Always follow your healthcare professional's instructions.

## 2019-09-11 ENCOUNTER — NURSE TRIAGE (OUTPATIENT)
Dept: ADMINISTRATIVE | Facility: CLINIC | Age: 34
End: 2019-09-11

## 2019-09-11 ENCOUNTER — OFFICE VISIT (OUTPATIENT)
Dept: PRIMARY CARE CLINIC | Facility: CLINIC | Age: 34
End: 2019-09-11
Payer: COMMERCIAL

## 2019-09-11 VITALS
WEIGHT: 134.5 LBS | TEMPERATURE: 98 F | DIASTOLIC BLOOD PRESSURE: 72 MMHG | BODY MASS INDEX: 27.12 KG/M2 | SYSTOLIC BLOOD PRESSURE: 118 MMHG | HEART RATE: 72 BPM | HEIGHT: 59 IN

## 2019-09-11 DIAGNOSIS — E03.9 ACQUIRED HYPOTHYROIDISM: ICD-10-CM

## 2019-09-11 DIAGNOSIS — Z80.3 FAMILY HISTORY OF BREAST CANCER: ICD-10-CM

## 2019-09-11 DIAGNOSIS — G43.009 MIGRAINE WITHOUT AURA AND WITHOUT STATUS MIGRAINOSUS, NOT INTRACTABLE: ICD-10-CM

## 2019-09-11 DIAGNOSIS — L21.9 SEBORRHEIC DERMATITIS OF SCALP: ICD-10-CM

## 2019-09-11 DIAGNOSIS — M79.603 PAIN OF UPPER EXTREMITY, UNSPECIFIED LATERALITY: Primary | ICD-10-CM

## 2019-09-11 PROCEDURE — 99214 PR OFFICE/OUTPT VISIT, EST, LEVL IV, 30-39 MIN: ICD-10-PCS | Mod: S$GLB,,, | Performed by: FAMILY MEDICINE

## 2019-09-11 PROCEDURE — 99999 PR PBB SHADOW E&M-EST. PATIENT-LVL III: ICD-10-PCS | Mod: PBBFAC,,, | Performed by: FAMILY MEDICINE

## 2019-09-11 PROCEDURE — 99999 PR PBB SHADOW E&M-EST. PATIENT-LVL III: CPT | Mod: PBBFAC,,, | Performed by: FAMILY MEDICINE

## 2019-09-11 PROCEDURE — 99214 OFFICE O/P EST MOD 30 MIN: CPT | Mod: S$GLB,,, | Performed by: FAMILY MEDICINE

## 2019-09-11 RX ORDER — HYDROCODONE BITARTRATE AND ACETAMINOPHEN 7.5; 325 MG/1; MG/1
1 TABLET ORAL EVERY 6 HOURS PRN
Qty: 28 TABLET | Refills: 0 | Status: SHIPPED | OUTPATIENT
Start: 2019-09-11 | End: 2021-07-14

## 2019-09-11 RX ORDER — HYDROCODONE BITARTRATE AND ACETAMINOPHEN 7.5; 325 MG/1; MG/1
1 TABLET ORAL EVERY 6 HOURS PRN
Qty: 28 TABLET | Refills: 0 | Status: CANCELLED | OUTPATIENT
Start: 2019-09-11

## 2019-09-11 RX ORDER — METHYLPREDNISOLONE 4 MG/1
TABLET ORAL
Qty: 1 PACKAGE | Refills: 0 | Status: SHIPPED | OUTPATIENT
Start: 2019-09-11 | End: 2019-10-02

## 2019-09-11 RX ORDER — CYCLOBENZAPRINE HCL 5 MG
TABLET ORAL
Qty: 60 TABLET | Refills: 0 | Status: SHIPPED | OUTPATIENT
Start: 2019-09-11 | End: 2021-07-14

## 2019-09-11 NOTE — TELEPHONE ENCOUNTER
Reason for Disposition   Wrong number reached. Phone number verified.    Additional Information   Negative: Caller has already spoken with the PCP (or office), and has no further questions   Negative: Caller has already spoken with another triager and has no further questions   Negative: Caller has already spoken with another triager or PCP (or office), and has further questions and triager able to answer questions.   Negative: Busy signal.  First attempt to contact caller.  Follow-up call scheduled within 15 minutes.   Negative: No answer.  First attempt to contact caller.  Follow-up call scheduled within 15 minutes.   Negative: Message left on identified voicemail   Negative: Message left on unidentified voice mail. Phone number verified.   Negative: Message left with person in household    Protocols used: NO CONTACT OR DUPLICATE CONTACT CALL-A-[place]

## 2019-09-11 NOTE — TELEPHONE ENCOUNTER
Pt having shoulder pain and the back of her arm, and her fingers feel funny, almost numb.  Pain began yesterday.  Rates pain 09/10 with movement, constant.  She is unable to dress or do much of her usual daily activities.  She took advil this am, which helped for only about 30 minutes then the pain came back.  Left side pain also, but only if she bends over at the waist.      Reason for Disposition   SEVERE pain (e.g., excruciating, unable to do any normal activities)    Additional Information   Negative: Shock suspected (e.g., cold/pale/clammy skin, too weak to stand, low BP, rapid pulse)   Negative: Similar pain previously and it was from 'heart attack'   Negative: Similar pain previously and it was from 'angina' and not relieved by nitroglycerin   Negative: Sounds like a life-threatening emergency to the triager   Negative: Chest pain   Negative: Followed an injury to shoulder   Negative: Difficulty breathing or unusual sweating (e.g., sweating without exertion)   Negative: Pain lasting > 5 minutes and pain also present in chest (Exception: pain is clearly made worse by movement)   Negative: Age > 40 and no obvious cause and pain even when not moving the arm (Exception: pain is clearly made worse by moving arm or bending neck)   Negative: Red area or streak and fever   Negative: Swollen joint and fever   Negative: Entire arm is swollen   Negative: Patient sounds very sick or weak to the triager    Protocols used: SHOULDER PAIN-A-OH

## 2019-09-11 NOTE — TELEPHONE ENCOUNTER
Called to the number listed, confirmed the number, there is nobody there named Pop, or Chayo, was told I have the wrong number.  Attempted to reach patient at her listed home/mobile number, no answer, left vm.

## 2019-09-16 PROBLEM — E03.9 ACQUIRED HYPOTHYROIDISM: Status: ACTIVE | Noted: 2019-09-16

## 2019-09-16 NOTE — PROGRESS NOTES
Subjective:   Patient ID: Chayo Williamson is a 34 y.o. female.    Chief Complaint: Arm Pain (left)      Arm Pain    The incident occurred 5 to 7 days ago. There was no injury mechanism. The pain is present in the upper left arm. The quality of the pain is described as aching. The pain does not radiate. The pain is at a severity of 8/10. The pain is severe. The pain has been constant since the incident. Pertinent negatives include no chest pain, muscle weakness, numbness or tingling. Nothing aggravates the symptoms. The treatment provided no relief.       Patient queried and denies any further complaints.    LOCATION  DURATION  SEVERITY  QUALITY  TIMING  CAUSE  ASSOCIATED SYMPTOMS  MODIFIERS.    ALLERGIES AND MEDICATIONS: updated and reviewed.  Review of patient's allergies indicates:  No Known Allergies    Current Outpatient Medications:     ketoconazole (NIZORAL) 2 % shampoo, CHRIS 1 APPLICATION TO THE SCALP WEEKLY, Disp: , Rfl: 3    cyclobenzaprine (FLEXERIL) 5 MG tablet, 1-2 po qhs prn muscle spasms, Disp: 60 tablet, Rfl: 0    HYDROcodone-acetaminophen (NORCO) 7.5-325 mg per tablet, Take 1 tablet by mouth every 6 (six) hours as needed for Pain. Medically necessary qty Dx M54.5, Disp: 28 tablet, Rfl: 0    levothyroxine (SYNTHROID) 25 MCG tablet, Take 1 tablet (25 mcg total) by mouth once daily., Disp: 30 tablet, Rfl: 11    methylPREDNISolone (MEDROL DOSEPACK) 4 mg tablet, use as directed, Disp: 1 Package, Rfl: 0    propranolol (INNOPRAN XL) 80 mg 24 hr capsule, Take 1 capsule (80 mg total) by mouth every evening., Disp: 90 capsule, Rfl: 0    sumatriptan (IMITREX) 25 MG Tab, Take 1 tablet (25 mg total) by mouth once as needed (may repeat in 1-2 hrs for max efficacy)., Disp: 27 tablet, Rfl: 0    Review of Systems   Constitutional: Negative for activity change, appetite change, chills, diaphoresis, fatigue, fever and unexpected weight change.   HENT: Negative for congestion, ear discharge, ear pain,  "facial swelling, hearing loss, nosebleeds, postnasal drip, rhinorrhea, sinus pressure, sneezing, sore throat, tinnitus, trouble swallowing and voice change.    Eyes: Negative for photophobia, pain, discharge, redness, itching and visual disturbance.   Respiratory: Negative for cough, chest tightness, shortness of breath and wheezing.    Cardiovascular: Negative for chest pain, palpitations and leg swelling.   Gastrointestinal: Negative for abdominal distention, abdominal pain, anal bleeding, blood in stool, constipation, diarrhea, nausea, rectal pain and vomiting.   Endocrine: Negative for cold intolerance, heat intolerance, polydipsia, polyphagia and polyuria.   Genitourinary: Negative for difficulty urinating, dysuria and flank pain.   Musculoskeletal: Negative for arthralgias, back pain, joint swelling, myalgias and neck pain.   Skin: Negative for rash.   Neurological: Negative for dizziness, tingling, tremors, seizures, syncope, speech difficulty, weakness, light-headedness, numbness and headaches.   Psychiatric/Behavioral: Negative for behavioral problems, confusion, decreased concentration, dysphoric mood, sleep disturbance and suicidal ideas. The patient is not nervous/anxious and is not hyperactive.        Objective:     Vitals:    09/11/19 1631   BP: 118/72   Pulse: 72   Temp: 98.2 °F (36.8 °C)   TempSrc: Oral   Weight: 61 kg (134 lb 7.7 oz)   Height: 4' 11" (1.499 m)   PainSc:   8   PainLoc: Arm     Body mass index is 27.16 kg/m².    Physical Exam   Constitutional: She is oriented to person, place, and time. She appears well-developed and well-nourished. She is cooperative. She does not have a sickly appearance. No distress.   HENT:   Head: Normocephalic and atraumatic.   Right Ear: Hearing, tympanic membrane, external ear and ear canal normal. No tenderness.   Left Ear: Hearing, tympanic membrane, external ear and ear canal normal. No tenderness.   Nose: Nose normal.   Mouth/Throat: Oropharynx is clear " and moist.   Eyes: Pupils are equal, round, and reactive to light. Conjunctivae and lids are normal. Right eye exhibits no discharge. Left eye exhibits no discharge. Right conjunctiva is not injected. Left conjunctiva is not injected. No scleral icterus. Right eye exhibits normal extraocular motion. Left eye exhibits normal extraocular motion.   Neck: Normal range of motion. Neck supple. No JVD present. Carotid bruit is not present. No tracheal deviation and no edema present. No thyromegaly present.   Cardiovascular: Normal rate, regular rhythm, normal heart sounds and normal pulses. Exam reveals no friction rub.   No murmur heard.  Pulmonary/Chest: Effort normal and breath sounds normal. No accessory muscle usage. No respiratory distress. She has no wheezes. She has no rhonchi. She has no rales.   Abdominal: Soft. Bowel sounds are normal. She exhibits no distension, no abdominal bruit, no pulsatile midline mass and no mass. There is no hepatosplenomegaly. There is no tenderness. There is no rebound, no guarding, no CVA tenderness, no tenderness at McBurney's point and negative Flores's sign.   Musculoskeletal: She exhibits no edema.   Lymphadenopathy:        Head (right side): No submandibular, no preauricular and no posterior auricular adenopathy present.        Head (left side): No submandibular, no preauricular and no posterior auricular adenopathy present.     She has no cervical adenopathy.   Neurological: She is alert and oriented to person, place, and time. GCS eye subscore is 4. GCS verbal subscore is 5. GCS motor subscore is 6.   Skin: Skin is warm and dry. No ecchymosis and no rash noted. Rash is not maculopapular and not urticarial. She is not diaphoretic. No cyanosis or erythema. Nails show no clubbing.   Psychiatric: She has a normal mood and affect. Her speech is normal and behavior is normal. Thought content normal. Her mood appears not anxious. Her affect is not angry and not inappropriate. She does  not exhibit a depressed mood.   Nursing note and vitals reviewed.      Assessment and Plan:   Chayo was seen today for arm pain.    Diagnoses and all orders for this visit:    Pain of upper extremity, unspecified laterality    Migraine without aura and without status migrainosus, not intractable    Seborrheic dermatitis of scalp    Family history of breast cancer    Acquired hypothyroidism    Other orders  -     Cancel: HYDROcodone-acetaminophen (NORCO) 7.5-325 mg per tablet; Take 1 tablet by mouth every 6 (six) hours as needed (severe pain). Medically necessary M54.5  -     methylPREDNISolone (MEDROL DOSEPACK) 4 mg tablet; use as directed  -     cyclobenzaprine (FLEXERIL) 5 MG tablet; 1-2 po qhs prn muscle spasms  -     HYDROcodone-acetaminophen (NORCO) 7.5-325 mg per tablet; Take 1 tablet by mouth every 6 (six) hours as needed for Pain. Medically necessary qty Dx M54.5      Does not need thyroid med. Last thyroid labs reviewed. Conservative tx as above    Follow up in about 2 weeks (around 9/25/2019).    THIS NOTE WILL BE SHARED WITH THE PATIENT.

## 2020-04-23 ENCOUNTER — LAB VISIT (OUTPATIENT)
Dept: PRIMARY CARE CLINIC | Facility: CLINIC | Age: 35
End: 2020-04-23
Payer: COMMERCIAL

## 2020-04-23 DIAGNOSIS — R05.9 COUGH: Primary | ICD-10-CM

## 2020-04-23 LAB — SARS-COV-2 RNA RESP QL NAA+PROBE: NOT DETECTED

## 2020-04-23 PROCEDURE — U0002 COVID-19 LAB TEST NON-CDC: HCPCS

## 2020-09-01 ENCOUNTER — PATIENT OUTREACH (OUTPATIENT)
Dept: ADMINISTRATIVE | Facility: OTHER | Age: 35
End: 2020-09-01

## 2020-09-01 ENCOUNTER — OFFICE VISIT (OUTPATIENT)
Dept: OBSTETRICS AND GYNECOLOGY | Facility: CLINIC | Age: 35
End: 2020-09-01
Attending: OBSTETRICS & GYNECOLOGY
Payer: COMMERCIAL

## 2020-09-01 VITALS
WEIGHT: 132.25 LBS | BODY MASS INDEX: 26.66 KG/M2 | SYSTOLIC BLOOD PRESSURE: 124 MMHG | HEIGHT: 59 IN | DIASTOLIC BLOOD PRESSURE: 72 MMHG

## 2020-09-01 DIAGNOSIS — Z12.4 SCREENING FOR MALIGNANT NEOPLASM OF THE CERVIX: Primary | ICD-10-CM

## 2020-09-01 DIAGNOSIS — N92.0 MENORRHAGIA WITH REGULAR CYCLE: ICD-10-CM

## 2020-09-01 PROCEDURE — 87624 HPV HI-RISK TYP POOLED RSLT: CPT

## 2020-09-01 PROCEDURE — 88175 CYTOPATH C/V AUTO FLUID REDO: CPT

## 2020-09-01 PROCEDURE — 99395 PREV VISIT EST AGE 18-39: CPT | Mod: S$GLB,,, | Performed by: OBSTETRICS & GYNECOLOGY

## 2020-09-01 PROCEDURE — 99395 PR PREVENTIVE VISIT,EST,18-39: ICD-10-PCS | Mod: S$GLB,,, | Performed by: OBSTETRICS & GYNECOLOGY

## 2020-09-01 NOTE — PROGRESS NOTES
Care Everywhere: updated  Immunization: updated  Health Maintenance: updated  Media Review:   Legacy Review:   Order placed:   Upcoming appts:  Scheduling ticket to schedule annual pcp visit sent to patient's portal

## 2020-09-02 ENCOUNTER — TELEPHONE (OUTPATIENT)
Dept: OBSTETRICS AND GYNECOLOGY | Facility: CLINIC | Age: 35
End: 2020-09-02

## 2020-09-02 ENCOUNTER — HOSPITAL ENCOUNTER (OUTPATIENT)
Dept: RADIOLOGY | Facility: OTHER | Age: 35
Discharge: HOME | End: 2020-09-02
Attending: OBSTETRICS & GYNECOLOGY
Payer: COMMERCIAL

## 2020-09-02 DIAGNOSIS — N92.0 MENORRHAGIA WITH REGULAR CYCLE: Primary | ICD-10-CM

## 2020-09-02 DIAGNOSIS — N92.0 MENORRHAGIA WITH REGULAR CYCLE: ICD-10-CM

## 2020-09-02 PROCEDURE — 76830 US PELVIS COMP WITH TRANSVAG NON-OB (XPD): ICD-10-PCS | Mod: 26,,, | Performed by: RADIOLOGY

## 2020-09-02 PROCEDURE — 76830 TRANSVAGINAL US NON-OB: CPT | Mod: 26,,, | Performed by: RADIOLOGY

## 2020-09-02 PROCEDURE — 76856 US PELVIS COMP WITH TRANSVAG NON-OB (XPD): ICD-10-PCS | Mod: 26,,, | Performed by: RADIOLOGY

## 2020-09-02 PROCEDURE — 76856 US EXAM PELVIC COMPLETE: CPT | Mod: 26,,, | Performed by: RADIOLOGY

## 2020-09-02 PROCEDURE — 76830 TRANSVAGINAL US NON-OB: CPT | Mod: TC

## 2020-09-02 NOTE — TELEPHONE ENCOUNTER
Pt called missed her pelvic ultrasound appointment yesterday and needs a new order. Order placed pt scheduled

## 2020-09-05 NOTE — PROGRESS NOTES
SUBJECTIVE:   34 y.o. female   for annual routine Pap and checkup. Patient's last menstrual period was 2020..  She desires pregnancy  She reports her cycles are every month.  She does report heavy bleeding for the 1st 4 days.        History reviewed. No pertinent past medical history.  Past Surgical History:   Procedure Laterality Date     SECTION       X 2     Social History     Socioeconomic History    Marital status:      Spouse name: Not on file    Number of children: 2    Years of education: Not on file    Highest education level: Not on file   Occupational History    Occupation:      Comment: [employer]   Social Needs    Financial resource strain: Not hard at all    Food insecurity     Worry: Never true     Inability: Never true    Transportation needs     Medical: No     Non-medical: No   Tobacco Use    Smoking status: Never Smoker    Smokeless tobacco: Never Used   Substance and Sexual Activity    Alcohol use: Yes     Frequency: Monthly or less     Drinks per session: 1 or 2     Binge frequency: Never     Comment: social    Drug use: No    Sexual activity: Yes     Partners: Male     Birth control/protection: None   Lifestyle    Physical activity     Days per week: 3 days     Minutes per session: 120 min    Stress: Rather much   Relationships    Social connections     Talks on phone: More than three times a week     Gets together: Not on file     Attends Restorationism service: Not on file     Active member of club or organization: Not on file     Attends meetings of clubs or organizations: Not on file     Relationship status: Not on file   Other Topics Concern    Not on file   Social History Narrative    2 children: 1 son and 1 daughter     Family History   Problem Relation Age of Onset    Breast cancer Mother 45    Hypertension Maternal Grandmother     Stroke Maternal Grandmother     Diabetes Maternal Grandmother     Breast cancer  Other     Colon cancer Paternal Aunt     Ovarian cancer Neg Hx     Cancer Neg Hx      OB History    Para Term  AB Living   2         2   SAB TAB Ectopic Multiple Live Births                  # Outcome Date GA Lbr Antoine/2nd Weight Sex Delivery Anes PTL Lv   2       CS-LTranv      1       CS-LTranv              Current Outpatient Medications   Medication Sig Dispense Refill    cyclobenzaprine (FLEXERIL) 5 MG tablet 1-2 po qhs prn muscle spasms 60 tablet 0    HYDROcodone-acetaminophen (NORCO) 7.5-325 mg per tablet Take 1 tablet by mouth every 6 (six) hours as needed for Pain. Medically necessary qty Dx M54.5 28 tablet 0    ketoconazole (NIZORAL) 2 % shampoo CHRIS 1 APPLICATION TO THE SCALP WEEKLY  3    levothyroxine (SYNTHROID) 25 MCG tablet Take 1 tablet (25 mcg total) by mouth once daily. 30 tablet 11    propranolol (INNOPRAN XL) 80 mg 24 hr capsule Take 1 capsule (80 mg total) by mouth every evening. 90 capsule 0    sumatriptan (IMITREX) 25 MG Tab Take 1 tablet (25 mg total) by mouth once as needed (may repeat in 1-2 hrs for max efficacy). 27 tablet 0     No current facility-administered medications for this visit.      Allergies: Patient has no known allergies.     The ASCVD Risk score (Paulo DC Jr., et al., 2013) failed to calculate for the following reasons:    The 2013 ASCVD risk score is only valid for ages 40 to 79      ROS:  Constitutional: no weight loss, weight gain, fever, fatigue  Eyes:  No vision changes, glasses/contacts  ENT/Mouth: No ulcers, sinus problems, ears ringing, headache  Cardiovascular: No inability to lie flat, chest pain, exercise intolerance, swelling, heart palpitations  Respiratory: No wheezing, coughing blood, shortness of breath, or cough  Gastrointestinal: No diarrhea, bloody stool, nausea/vomiting, constipation, gas, hemorrhoids  Genitourinary: No blood in urine, painful urination, urgency of urination, frequency of urination, incomplete emptying,  incontinence, abnormal bleeding, painful periods, heavy periods, vaginal discharge, vaginal odor, painful intercourse, sexual problems, bleeding after intercourse.  Musculoskeletal: No muscle weakness  Skin/Breast: No painful breasts, nipple discharge, masses, rash, ulcers  Neurological: No passing out, seizures, numbness, headache  Endocrine: No diabetes, hypothyroid, hyperthyroid, hot flashes, hair loss, abnormal hair growth, acne  Psychiatric: No depression, crying  Hematologic: No bruises, bleeding, swollen lymph nodes, anemia.      Physical Exam:   Constitutional: She is oriented to person, place, and time. She appears well-developed and well-nourished.      Neck: Normal range of motion. No tracheal deviation present. No thyromegaly present.    Cardiovascular: Exam reveals no edema.     Pulmonary/Chest: Effort normal. She exhibits no mass, no tenderness, no deformity and no retraction. Right breast exhibits no inverted nipple, no mass, no nipple discharge, no skin change, no tenderness, presence, no bleeding and no swelling. Left breast exhibits no inverted nipple, no mass, no nipple discharge, no skin change, no tenderness, presence, no bleeding and no swelling. Breasts are symmetrical.        Abdominal: Soft. She exhibits no distension and no mass. There is no abdominal tenderness. There is no rebound and no guarding. No hernia. Hernia confirmed negative in the left inguinal area.     Genitourinary:    Vagina and uterus normal.   Rectum:      No external hemorrhoid.   There is no rash, tenderness or lesion on the right labia. There is no rash, tenderness or lesion on the left labia. Uterus is not deviated. Cervix is normal. No no adexnal prolapse. Right adnexum displays no mass, no tenderness and no fullness. Left adnexum displays no mass, no tenderness and no fullness. No tenderness, bleeding, rectocele, cystocele or unspecified prolapse of vaginal walls in the vagina. Cervix exhibits no motion tenderness,  no discharge and no friability. negative for vaginal discharge          Musculoskeletal: Normal range of motion and moves all extremeties. No edema.       Neurological: She is alert and oriented to person, place, and time.    Skin: No rash noted. No erythema. No pallor.    Psychiatric: She has a normal mood and affect. Her behavior is normal. Judgment and thought content normal.         ASSESSMENT:   well woman    PLAN:   pap smear  Pelvic ultrasound  Prenatal vitamin daily

## 2020-09-08 LAB
HPV HR 12 DNA SPEC QL NAA+PROBE: NEGATIVE
HPV16 AG SPEC QL: NEGATIVE
HPV18 DNA SPEC QL NAA+PROBE: NEGATIVE

## 2020-09-17 LAB
FINAL PATHOLOGIC DIAGNOSIS: NORMAL
Lab: NORMAL

## 2020-10-08 ENCOUNTER — PATIENT MESSAGE (OUTPATIENT)
Dept: PRIMARY CARE CLINIC | Facility: CLINIC | Age: 35
End: 2020-10-08

## 2020-12-16 ENCOUNTER — OFFICE VISIT (OUTPATIENT)
Dept: URGENT CARE | Facility: CLINIC | Age: 35
End: 2020-12-16
Payer: COMMERCIAL

## 2020-12-16 VITALS
BODY MASS INDEX: 25.8 KG/M2 | SYSTOLIC BLOOD PRESSURE: 122 MMHG | HEIGHT: 59 IN | HEART RATE: 81 BPM | DIASTOLIC BLOOD PRESSURE: 77 MMHG | RESPIRATION RATE: 18 BRPM | OXYGEN SATURATION: 100 % | WEIGHT: 128 LBS | TEMPERATURE: 98 F

## 2020-12-16 DIAGNOSIS — J02.9 PHARYNGITIS, UNSPECIFIED ETIOLOGY: Primary | ICD-10-CM

## 2020-12-16 LAB
CTP QC/QA: YES
MOLECULAR STREP A: NEGATIVE

## 2020-12-16 PROCEDURE — 99214 PR OFFICE/OUTPT VISIT, EST, LEVL IV, 30-39 MIN: ICD-10-PCS | Mod: S$GLB,,, | Performed by: PHYSICIAN ASSISTANT

## 2020-12-16 PROCEDURE — 87651 STREP A DNA AMP PROBE: CPT | Mod: QW,S$GLB,, | Performed by: PHYSICIAN ASSISTANT

## 2020-12-16 PROCEDURE — 99214 OFFICE O/P EST MOD 30 MIN: CPT | Mod: S$GLB,,, | Performed by: PHYSICIAN ASSISTANT

## 2020-12-16 PROCEDURE — 87651 POCT STREP A MOLECULAR: ICD-10-PCS | Mod: QW,S$GLB,, | Performed by: PHYSICIAN ASSISTANT

## 2020-12-16 RX ORDER — LIDOCAINE HYDROCHLORIDE 20 MG/ML
SOLUTION OROPHARYNGEAL
Qty: 100 ML | Refills: 0 | Status: SHIPPED | OUTPATIENT
Start: 2020-12-16 | End: 2021-08-09

## 2020-12-16 NOTE — PROGRESS NOTES
"Subjective:       Patient ID: Chayo Williamson is a 35 y.o. female.    Vitals:  height is 4' 11" (1.499 m) and weight is 58.1 kg (128 lb). Her oral temperature is 97.8 °F (36.6 °C). Her blood pressure is 122/77 and her pulse is 81. Her respiration is 18 and oxygen saturation is 100%.     Chief Complaint: Sore Throat    Pt states she have a sore throat that started yesterday.     Sore Throat   This is a new problem. The current episode started yesterday. The problem has been unchanged. Neither side of throat is experiencing more pain than the other. There has been no fever. The patient is experiencing no pain. Pertinent negatives include no congestion, coughing, ear pain, shortness of breath, stridor or vomiting. She has tried nothing for the symptoms.       Constitution: Negative for chills, sweating, fatigue and fever.   HENT: Positive for sore throat. Negative for ear pain, congestion, sinus pain, sinus pressure and voice change.    Neck: Negative for painful lymph nodes.   Eyes: Negative for eye redness.   Respiratory: Negative for chest tightness, cough, sputum production, bloody sputum, COPD, shortness of breath, stridor, wheezing and asthma.    Gastrointestinal: Negative for nausea and vomiting.   Musculoskeletal: Negative for muscle ache.   Skin: Negative for rash.   Allergic/Immunologic: Negative for seasonal allergies and asthma.   Hematologic/Lymphatic: Negative for swollen lymph nodes.       Objective:      Physical Exam   Constitutional: She is oriented to person, place, and time. She appears well-developed. She is cooperative.  Non-toxic appearance. She does not appear ill. No distress.   HENT:   Head: Normocephalic and atraumatic.   Ears:   Right Ear: Hearing, tympanic membrane, external ear and ear canal normal.   Left Ear: Hearing, tympanic membrane, external ear and ear canal normal.   Nose: Nose normal. No mucosal edema or rhinorrhea. Right sinus exhibits no maxillary sinus tenderness and " no frontal sinus tenderness. Left sinus exhibits no maxillary sinus tenderness and no frontal sinus tenderness.   Mouth/Throat: Mucous membranes are normal. Posterior oropharyngeal erythema and cobblestoning present. No oropharyngeal exudate or posterior oropharyngeal edema. No tonsillar exudate.   Eyes: Conjunctivae, EOM and lids are normal. Right eye exhibits no discharge. Left eye exhibits no discharge. No scleral icterus.   Neck: Trachea normal, normal range of motion, full passive range of motion without pain and phonation normal. Neck supple.   Cardiovascular: Normal rate, regular rhythm and normal heart sounds. Exam reveals no gallop.   No murmur heard.  Pulmonary/Chest: Effort normal and breath sounds normal. No respiratory distress. She has no decreased breath sounds. She has no wheezes. She has no rhonchi. She has no rales.   Musculoskeletal:         General: No deformity.   Lymphadenopathy:        Head (right side): No submandibular and no tonsillar adenopathy present.        Head (left side): No submandibular and no tonsillar adenopathy present.   Neurological: She is alert and oriented to person, place, and time. Coordination normal.   Skin: Skin is warm, dry, intact, not diaphoretic and not pale. Psychiatric: Her speech is normal and behavior is normal. Judgment and thought content normal.   Nursing note and vitals reviewed.        Assessment:       1. Pharyngitis, unspecified etiology        Office Visit on 12/16/2020   Component Date Value Ref Range Status    Molecular Strep A, POC 12/16/2020 Negative  Negative Final     Acceptable 12/16/2020 Yes   Final     Plan:         Pharyngitis, unspecified etiology  -     POCT Strep A, Molecular  -     lidocaine HCl 2% (XYLOCAINE) 2 % Soln; Gargle 3 times daily as needed for throat pain.  Dispense: 100 mL; Refill: 0      Patient Instructions     Gargle viscous lidocaine as needed before meals for throat pain.  Take tylenol/motrin as needed for  pain.    Please follow up with your primary care provider within 2-5 days if your signs and symptoms have not resolved or worsen.     If your condition worsens or fails to improve we recommend that you receive another evaluation at the emergency room immediately or contact your primary medical clinic to discuss your concerns.   You must understand that you have received an Urgent Care treatment only and that you may be released before all of your medical problems are known or treated. You, the patient, will arrange for follow up care as instructed.         Self-Care for Sore Throats    Sore throats happen for many reasons, such as colds, allergies, and infections caused by viruses or bacteria. In any case, your throat becomes red and sore. Your goal for self-care is to reduce your discomfort while giving your throat a chance to heal.  Moisten and soothe your throat  Tips include the following:  · Try a sip of water first thing after waking up.  · Keep your throat moist by drinking 6 or more glasses of clear liquids every day.  · Run a cool-air humidifier in your room overnight.  · Avoid cigarette smoke.   · Suck on throat lozenges, cough drops, hard candy, ice chips, or frozen fruit-juice bars. Use the sugar-free versions if your diet or medical condition requires them.  Gargle to ease irritation  Gargling every hour or 2 can ease irritation. Try gargling with 1 of these solutions:  · 1/4 teaspoon of salt in 1/2 cup of warm water  · An over-the-counter anesthetic gargle  Use medicine for more relief  Over-the-counter medicine can reduce sore throat symptoms. Ask your pharmacist if you have questions about which medicine to use:  · Ease pain with anesthetic sprays. Aspirin or an aspirin substitute also helps. Remember, never give aspirin to anyone 18 or younger, or if you are already taking blood thinners.   · For sore throats caused by allergies, try antihistamines to block the allergic reaction.  · Remember: unless  a sore throat is caused by a bacterial infection, antibiotics wont help you.  Prevent future sore throats  Prevention tips include the following:  · Stop smoking or reduce contact with secondhand smoke. Smoke irritates the tender throat lining.  · Limit contact with pets and with allergy-causing substances, such as pollen and mold.  · When youre around someone with a sore throat or cold, wash your hands often to keep viruses or bacteria from spreading.  · Dont strain your vocal cords.  Call your healthcare provider  Contact your healthcare provider if you have:  · A temperature over 101°F (38.3°C)  · White spots on the throat  · Great difficulty swallowing  · Trouble breathing  · A skin rash  · Recent exposure to someone else with strep bacteria  · Severe hoarseness and swollen glands in the neck or jaw   Date Last Reviewed: 8/1/2016  © 7926-6573 [employer]. [address] [place] [address]. All rights reserved. This information is not intended as a substitute for professional medical care. Always follow your healthcare professional's instructions.

## 2021-01-04 ENCOUNTER — PATIENT MESSAGE (OUTPATIENT)
Dept: ADMINISTRATIVE | Facility: HOSPITAL | Age: 36
End: 2021-01-04

## 2021-01-14 ENCOUNTER — PATIENT MESSAGE (OUTPATIENT)
Dept: OBSTETRICS AND GYNECOLOGY | Facility: CLINIC | Age: 36
End: 2021-01-14

## 2021-04-05 ENCOUNTER — PATIENT MESSAGE (OUTPATIENT)
Dept: ADMINISTRATIVE | Facility: HOSPITAL | Age: 36
End: 2021-04-05

## 2021-04-16 ENCOUNTER — PATIENT MESSAGE (OUTPATIENT)
Dept: RESEARCH | Facility: HOSPITAL | Age: 36
End: 2021-04-16

## 2021-06-18 ENCOUNTER — TELEPHONE (OUTPATIENT)
Dept: OBSTETRICS AND GYNECOLOGY | Facility: CLINIC | Age: 36
End: 2021-06-18

## 2021-07-06 ENCOUNTER — PATIENT MESSAGE (OUTPATIENT)
Dept: ADMINISTRATIVE | Facility: HOSPITAL | Age: 36
End: 2021-07-06

## 2021-07-14 ENCOUNTER — LAB VISIT (OUTPATIENT)
Dept: LAB | Facility: OTHER | Age: 36
End: 2021-07-14
Attending: PHYSICIAN ASSISTANT
Payer: COMMERCIAL

## 2021-07-14 ENCOUNTER — PATIENT MESSAGE (OUTPATIENT)
Dept: MATERNAL FETAL MEDICINE | Facility: CLINIC | Age: 36
End: 2021-07-14

## 2021-07-14 ENCOUNTER — INITIAL PRENATAL (OUTPATIENT)
Dept: OBSTETRICS AND GYNECOLOGY | Facility: CLINIC | Age: 36
End: 2021-07-14
Payer: COMMERCIAL

## 2021-07-14 VITALS
DIASTOLIC BLOOD PRESSURE: 66 MMHG | SYSTOLIC BLOOD PRESSURE: 132 MMHG | BODY MASS INDEX: 26.14 KG/M2 | WEIGHT: 129.44 LBS

## 2021-07-14 DIAGNOSIS — N91.5 OLIGOMENORRHEA, UNSPECIFIED TYPE: Primary | ICD-10-CM

## 2021-07-14 DIAGNOSIS — Z32.01 POSITIVE PREGNANCY TEST: ICD-10-CM

## 2021-07-14 DIAGNOSIS — N91.5 OLIGOMENORRHEA, UNSPECIFIED TYPE: ICD-10-CM

## 2021-07-14 LAB
ABO + RH BLD: NORMAL
B-HCG UR QL: POSITIVE
BASOPHILS # BLD AUTO: 0.02 K/UL (ref 0–0.2)
BASOPHILS NFR BLD: 0.2 % (ref 0–1.9)
BLD GP AB SCN CELLS X3 SERPL QL: NORMAL
CTP QC/QA: YES
DIFFERENTIAL METHOD: ABNORMAL
EOSINOPHIL # BLD AUTO: 0.1 K/UL (ref 0–0.5)
EOSINOPHIL NFR BLD: 0.9 % (ref 0–8)
ERYTHROCYTE [DISTWIDTH] IN BLOOD BY AUTOMATED COUNT: 21 % (ref 11.5–14.5)
HCT VFR BLD AUTO: 33.5 % (ref 37–48.5)
HGB BLD-MCNC: 10.5 G/DL (ref 12–16)
IMM GRANULOCYTES # BLD AUTO: 0.03 K/UL (ref 0–0.04)
IMM GRANULOCYTES NFR BLD AUTO: 0.4 % (ref 0–0.5)
LYMPHOCYTES # BLD AUTO: 1.3 K/UL (ref 1–4.8)
LYMPHOCYTES NFR BLD: 15 % (ref 18–48)
MCH RBC QN AUTO: 22.1 PG (ref 27–31)
MCHC RBC AUTO-ENTMCNC: 31.3 G/DL (ref 32–36)
MCV RBC AUTO: 70 FL (ref 82–98)
MONOCYTES # BLD AUTO: 0.5 K/UL (ref 0.3–1)
MONOCYTES NFR BLD: 6.3 % (ref 4–15)
NEUTROPHILS # BLD AUTO: 6.6 K/UL (ref 1.8–7.7)
NEUTROPHILS NFR BLD: 77.2 % (ref 38–73)
NRBC BLD-RTO: 0 /100 WBC
PLATELET # BLD AUTO: 267 K/UL (ref 150–450)
PLATELET BLD QL SMEAR: ABNORMAL
PMV BLD AUTO: ABNORMAL FL (ref 9.2–12.9)
RBC # BLD AUTO: 4.76 M/UL (ref 4–5.4)
WBC # BLD AUTO: 8.51 K/UL (ref 3.9–12.7)

## 2021-07-14 PROCEDURE — 0500F INITIAL PRENATAL CARE VISIT: CPT | Mod: S$GLB,,, | Performed by: PHYSICIAN ASSISTANT

## 2021-07-14 PROCEDURE — 85025 COMPLETE CBC W/AUTO DIFF WBC: CPT | Performed by: PHYSICIAN ASSISTANT

## 2021-07-14 PROCEDURE — 87389 HIV-1 AG W/HIV-1&-2 AB AG IA: CPT | Performed by: PHYSICIAN ASSISTANT

## 2021-07-14 PROCEDURE — 86900 BLOOD TYPING SEROLOGIC ABO: CPT | Performed by: PHYSICIAN ASSISTANT

## 2021-07-14 PROCEDURE — 36415 COLL VENOUS BLD VENIPUNCTURE: CPT | Performed by: PHYSICIAN ASSISTANT

## 2021-07-14 PROCEDURE — 86592 SYPHILIS TEST NON-TREP QUAL: CPT | Performed by: PHYSICIAN ASSISTANT

## 2021-07-14 PROCEDURE — 87340 HEPATITIS B SURFACE AG IA: CPT | Performed by: PHYSICIAN ASSISTANT

## 2021-07-14 PROCEDURE — 86762 RUBELLA ANTIBODY: CPT | Performed by: PHYSICIAN ASSISTANT

## 2021-07-14 PROCEDURE — 0500F PR INITIAL PRENATAL CARE VISIT: ICD-10-PCS | Mod: S$GLB,,, | Performed by: PHYSICIAN ASSISTANT

## 2021-07-15 LAB
RUBV IGG SER-ACNC: 42.2 IU/ML
RUBV IGG SER-IMP: REACTIVE

## 2021-07-16 LAB
HBV SURFACE AG SERPL QL IA: NEGATIVE
HIV 1+2 AB+HIV1 P24 AG SERPL QL IA: NEGATIVE
RPR SER QL: NORMAL

## 2021-07-19 ENCOUNTER — PATIENT MESSAGE (OUTPATIENT)
Dept: ADMINISTRATIVE | Facility: OTHER | Age: 36
End: 2021-07-19

## 2021-07-20 ENCOUNTER — PATIENT MESSAGE (OUTPATIENT)
Dept: OBSTETRICS AND GYNECOLOGY | Facility: CLINIC | Age: 36
End: 2021-07-20

## 2021-08-09 ENCOUNTER — PATIENT MESSAGE (OUTPATIENT)
Dept: OBSTETRICS AND GYNECOLOGY | Facility: CLINIC | Age: 36
End: 2021-08-09

## 2021-08-09 ENCOUNTER — ROUTINE PRENATAL (OUTPATIENT)
Dept: OBSTETRICS AND GYNECOLOGY | Facility: CLINIC | Age: 36
End: 2021-08-09
Attending: OBSTETRICS & GYNECOLOGY
Payer: COMMERCIAL

## 2021-08-09 ENCOUNTER — TELEPHONE (OUTPATIENT)
Dept: OBSTETRICS AND GYNECOLOGY | Facility: CLINIC | Age: 36
End: 2021-08-09

## 2021-08-09 VITALS — DIASTOLIC BLOOD PRESSURE: 64 MMHG | SYSTOLIC BLOOD PRESSURE: 104 MMHG | BODY MASS INDEX: 26.86 KG/M2 | WEIGHT: 133 LBS

## 2021-08-09 DIAGNOSIS — Z13.1 SCREENING FOR DIABETES MELLITUS: Primary | ICD-10-CM

## 2021-08-09 DIAGNOSIS — Z13.0 SCREENING, ANEMIA, DEFICIENCY, IRON: ICD-10-CM

## 2021-08-09 DIAGNOSIS — Z34.82 PRENATAL CARE, SUBSEQUENT PREGNANCY, SECOND TRIMESTER: ICD-10-CM

## 2021-08-09 PROCEDURE — 0502F SUBSEQUENT PRENATAL CARE: CPT | Mod: S$GLB,,, | Performed by: OBSTETRICS & GYNECOLOGY

## 2021-08-09 PROCEDURE — 0502F PR SUBSEQUENT PRENATAL CARE: ICD-10-PCS | Mod: S$GLB,,, | Performed by: OBSTETRICS & GYNECOLOGY

## 2021-08-10 ENCOUNTER — OFFICE VISIT (OUTPATIENT)
Dept: URGENT CARE | Facility: CLINIC | Age: 36
End: 2021-08-10
Payer: COMMERCIAL

## 2021-08-10 ENCOUNTER — PATIENT MESSAGE (OUTPATIENT)
Dept: OBSTETRICS AND GYNECOLOGY | Facility: CLINIC | Age: 36
End: 2021-08-10

## 2021-08-10 VITALS
DIASTOLIC BLOOD PRESSURE: 61 MMHG | HEART RATE: 71 BPM | RESPIRATION RATE: 16 BRPM | BODY MASS INDEX: 26.81 KG/M2 | TEMPERATURE: 99 F | HEIGHT: 59 IN | OXYGEN SATURATION: 98 % | WEIGHT: 133 LBS | SYSTOLIC BLOOD PRESSURE: 97 MMHG

## 2021-08-10 DIAGNOSIS — J06.9 VIRAL URI: Primary | ICD-10-CM

## 2021-08-10 LAB
CTP QC/QA: YES
SARS-COV-2 RDRP RESP QL NAA+PROBE: NEGATIVE

## 2021-08-10 PROCEDURE — 99214 PR OFFICE/OUTPT VISIT, EST, LEVL IV, 30-39 MIN: ICD-10-PCS | Mod: S$GLB,,, | Performed by: FAMILY MEDICINE

## 2021-08-10 PROCEDURE — U0002 COVID-19 LAB TEST NON-CDC: HCPCS | Mod: QW,S$GLB,, | Performed by: FAMILY MEDICINE

## 2021-08-10 PROCEDURE — 99214 OFFICE O/P EST MOD 30 MIN: CPT | Mod: S$GLB,,, | Performed by: FAMILY MEDICINE

## 2021-08-10 PROCEDURE — U0002: ICD-10-PCS | Mod: QW,S$GLB,, | Performed by: FAMILY MEDICINE

## 2021-08-27 ENCOUNTER — PATIENT MESSAGE (OUTPATIENT)
Dept: OBSTETRICS AND GYNECOLOGY | Facility: CLINIC | Age: 36
End: 2021-08-27

## 2021-09-07 ENCOUNTER — PATIENT MESSAGE (OUTPATIENT)
Dept: OBSTETRICS AND GYNECOLOGY | Facility: CLINIC | Age: 36
End: 2021-09-07

## 2021-09-10 ENCOUNTER — PROCEDURE VISIT (OUTPATIENT)
Dept: MATERNAL FETAL MEDICINE | Facility: CLINIC | Age: 36
End: 2021-09-10
Payer: COMMERCIAL

## 2021-09-10 DIAGNOSIS — Z3A.20 20 WEEKS GESTATION OF PREGNANCY: ICD-10-CM

## 2021-09-10 DIAGNOSIS — Z36.4 ANTENATAL SCREENING FOR FETAL GROWTH RETARDATION USING ULTRASONICS: ICD-10-CM

## 2021-09-10 DIAGNOSIS — Z32.01 POSITIVE PREGNANCY TEST: ICD-10-CM

## 2021-09-10 DIAGNOSIS — Z36.3 ANTENATAL SCREENING FOR MALFORMATION USING ULTRASONICS: ICD-10-CM

## 2021-09-10 DIAGNOSIS — O09.522 MULTIGRAVIDA OF ADVANCED MATERNAL AGE IN SECOND TRIMESTER: ICD-10-CM

## 2021-09-10 DIAGNOSIS — N91.5 OLIGOMENORRHEA, UNSPECIFIED TYPE: ICD-10-CM

## 2021-09-10 DIAGNOSIS — Z36.89 ENCOUNTER FOR ULTRASOUND TO ASSESS FETAL GROWTH: Primary | ICD-10-CM

## 2021-09-10 PROCEDURE — 76811 OB US DETAILED SNGL FETUS: CPT | Mod: 26,S$GLB,, | Performed by: OBSTETRICS & GYNECOLOGY

## 2021-09-10 PROCEDURE — 76811 PR US, OB FETAL EVAL & EXAM, TRANSABDOM,FIRST GESTATION: ICD-10-PCS | Mod: 26,S$GLB,, | Performed by: OBSTETRICS & GYNECOLOGY

## 2021-10-05 ENCOUNTER — PATIENT MESSAGE (OUTPATIENT)
Dept: ADMINISTRATIVE | Facility: HOSPITAL | Age: 36
End: 2021-10-05

## 2021-10-06 ENCOUNTER — ROUTINE PRENATAL (OUTPATIENT)
Dept: OBSTETRICS AND GYNECOLOGY | Facility: CLINIC | Age: 36
End: 2021-10-06
Payer: COMMERCIAL

## 2021-10-06 ENCOUNTER — LAB VISIT (OUTPATIENT)
Dept: LAB | Facility: OTHER | Age: 36
End: 2021-10-06
Attending: OBSTETRICS & GYNECOLOGY
Payer: COMMERCIAL

## 2021-10-06 VITALS
BODY MASS INDEX: 29.37 KG/M2 | WEIGHT: 145.38 LBS | SYSTOLIC BLOOD PRESSURE: 108 MMHG | DIASTOLIC BLOOD PRESSURE: 62 MMHG

## 2021-10-06 DIAGNOSIS — Z34.82 PRENATAL CARE, SUBSEQUENT PREGNANCY, SECOND TRIMESTER: Primary | ICD-10-CM

## 2021-10-06 DIAGNOSIS — Z13.0 SCREENING, ANEMIA, DEFICIENCY, IRON: ICD-10-CM

## 2021-10-06 DIAGNOSIS — Z13.1 SCREENING FOR DIABETES MELLITUS: ICD-10-CM

## 2021-10-06 LAB
BASOPHILS # BLD AUTO: 0.02 K/UL (ref 0–0.2)
BASOPHILS NFR BLD: 0.2 % (ref 0–1.9)
DIFFERENTIAL METHOD: ABNORMAL
EOSINOPHIL # BLD AUTO: 0.1 K/UL (ref 0–0.5)
EOSINOPHIL NFR BLD: 1.1 % (ref 0–8)
ERYTHROCYTE [DISTWIDTH] IN BLOOD BY AUTOMATED COUNT: 14.9 % (ref 11.5–14.5)
GLUCOSE SERPL-MCNC: 82 MG/DL (ref 70–140)
HCT VFR BLD AUTO: 28.4 % (ref 37–48.5)
HGB BLD-MCNC: 9 G/DL (ref 12–16)
IMM GRANULOCYTES # BLD AUTO: 0.04 K/UL (ref 0–0.04)
IMM GRANULOCYTES NFR BLD AUTO: 0.5 % (ref 0–0.5)
LYMPHOCYTES # BLD AUTO: 1.1 K/UL (ref 1–4.8)
LYMPHOCYTES NFR BLD: 13.4 % (ref 18–48)
MCH RBC QN AUTO: 23.7 PG (ref 27–31)
MCHC RBC AUTO-ENTMCNC: 31.7 G/DL (ref 32–36)
MCV RBC AUTO: 75 FL (ref 82–98)
MONOCYTES # BLD AUTO: 0.5 K/UL (ref 0.3–1)
MONOCYTES NFR BLD: 6.5 % (ref 4–15)
NEUTROPHILS # BLD AUTO: 6.5 K/UL (ref 1.8–7.7)
NEUTROPHILS NFR BLD: 78.3 % (ref 38–73)
NRBC BLD-RTO: 0 /100 WBC
PLATELET # BLD AUTO: 229 K/UL (ref 150–450)
PMV BLD AUTO: 12.5 FL (ref 9.2–12.9)
RBC # BLD AUTO: 3.79 M/UL (ref 4–5.4)
WBC # BLD AUTO: 8.27 K/UL (ref 3.9–12.7)

## 2021-10-06 PROCEDURE — 0502F SUBSEQUENT PRENATAL CARE: CPT | Mod: S$GLB,,, | Performed by: PHYSICIAN ASSISTANT

## 2021-10-06 PROCEDURE — 82950 GLUCOSE TEST: CPT | Performed by: OBSTETRICS & GYNECOLOGY

## 2021-10-06 PROCEDURE — 36415 COLL VENOUS BLD VENIPUNCTURE: CPT | Performed by: OBSTETRICS & GYNECOLOGY

## 2021-10-06 PROCEDURE — 0502F PR SUBSEQUENT PRENATAL CARE: ICD-10-PCS | Mod: S$GLB,,, | Performed by: PHYSICIAN ASSISTANT

## 2021-10-06 PROCEDURE — 85025 COMPLETE CBC W/AUTO DIFF WBC: CPT | Performed by: OBSTETRICS & GYNECOLOGY

## 2021-10-12 ENCOUNTER — HOSPITAL ENCOUNTER (EMERGENCY)
Facility: OTHER | Age: 36
Discharge: HOME | End: 2021-10-12
Attending: OBSTETRICS & GYNECOLOGY
Payer: COMMERCIAL

## 2021-10-12 VITALS
OXYGEN SATURATION: 99 % | RESPIRATION RATE: 16 BRPM | DIASTOLIC BLOOD PRESSURE: 58 MMHG | TEMPERATURE: 98 F | SYSTOLIC BLOOD PRESSURE: 105 MMHG | HEART RATE: 83 BPM

## 2021-10-12 DIAGNOSIS — Z3A.25 25 WEEKS GESTATION OF PREGNANCY: ICD-10-CM
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
## 2021-10-12 PROCEDURE — 99283 EMERGENCY DEPT VISIT LOW MDM: CPT | Mod: 25,,, | Performed by: OBSTETRICS & GYNECOLOGY

## 2021-10-12 PROCEDURE — 59025 PR FETAL 2N-STRESS TEST: ICD-10-PCS | Mod: 26,,, | Performed by: OBSTETRICS & GYNECOLOGY

## 2021-10-12 PROCEDURE — 99284 EMERGENCY DEPT VISIT MOD MDM: CPT | Mod: 25

## 2021-10-12 PROCEDURE — 99283 PR EMERGENCY DEPT VISIT,LEVEL III: ICD-10-PCS | Mod: 25,,, | Performed by: OBSTETRICS & GYNECOLOGY

## 2021-10-12 PROCEDURE — 59025 FETAL NON-STRESS TEST: CPT | Mod: 26,,, | Performed by: OBSTETRICS & GYNECOLOGY

## 2021-10-12 PROCEDURE — 59025 FETAL NON-STRESS TEST: CPT

## 2021-11-01 ENCOUNTER — TELEPHONE (OUTPATIENT)
Dept: OBSTETRICS AND GYNECOLOGY | Facility: CLINIC | Age: 36
End: 2021-11-01

## 2021-11-01 ENCOUNTER — ROUTINE PRENATAL (OUTPATIENT)
Dept: OBSTETRICS AND GYNECOLOGY | Facility: CLINIC | Age: 36
End: 2021-11-01
Attending: OBSTETRICS & GYNECOLOGY
Payer: COMMERCIAL

## 2021-11-01 VITALS
BODY MASS INDEX: 31.14 KG/M2 | DIASTOLIC BLOOD PRESSURE: 72 MMHG | WEIGHT: 154.19 LBS | SYSTOLIC BLOOD PRESSURE: 124 MMHG

## 2021-11-01 DIAGNOSIS — Z34.83 PRENATAL CARE, SUBSEQUENT PREGNANCY, THIRD TRIMESTER: Primary | ICD-10-CM

## 2021-11-01 PROCEDURE — 0502F SUBSEQUENT PRENATAL CARE: CPT | Mod: S$GLB,,, | Performed by: OBSTETRICS & GYNECOLOGY

## 2021-11-01 PROCEDURE — 0502F PR SUBSEQUENT PRENATAL CARE: ICD-10-PCS | Mod: S$GLB,,, | Performed by: OBSTETRICS & GYNECOLOGY

## 2021-11-29 ENCOUNTER — PATIENT MESSAGE (OUTPATIENT)
Dept: ADMINISTRATIVE | Facility: OTHER | Age: 36
End: 2021-11-29
Payer: COMMERCIAL

## 2021-11-30 ENCOUNTER — PATIENT MESSAGE (OUTPATIENT)
Dept: OBSTETRICS AND GYNECOLOGY | Facility: CLINIC | Age: 36
End: 2021-11-30

## 2021-11-30 ENCOUNTER — ROUTINE PRENATAL (OUTPATIENT)
Dept: OBSTETRICS AND GYNECOLOGY | Facility: CLINIC | Age: 36
End: 2021-11-30
Payer: COMMERCIAL

## 2021-11-30 ENCOUNTER — CLINICAL SUPPORT (OUTPATIENT)
Dept: OBSTETRICS AND GYNECOLOGY | Facility: CLINIC | Age: 36
End: 2021-11-30
Attending: OBSTETRICS & GYNECOLOGY
Payer: COMMERCIAL

## 2021-11-30 VITALS — BODY MASS INDEX: 31.23 KG/M2 | SYSTOLIC BLOOD PRESSURE: 99 MMHG | WEIGHT: 154.63 LBS | DIASTOLIC BLOOD PRESSURE: 63 MMHG

## 2021-11-30 DIAGNOSIS — Z34.83 PRENATAL CARE, SUBSEQUENT PREGNANCY, THIRD TRIMESTER: Primary | ICD-10-CM

## 2021-11-30 DIAGNOSIS — Z23 NEED FOR DIPHTHERIA-TETANUS-PERTUSSIS (TDAP) VACCINE: Primary | ICD-10-CM

## 2021-11-30 PROCEDURE — 90715 TDAP VACCINE 7 YRS/> IM: CPT | Mod: S$GLB,,, | Performed by: OBSTETRICS & GYNECOLOGY

## 2021-11-30 PROCEDURE — 90471 TDAP VACCINE GREATER THAN OR EQUAL TO 7YO IM: ICD-10-PCS | Mod: S$GLB,,, | Performed by: OBSTETRICS & GYNECOLOGY

## 2021-11-30 PROCEDURE — 0502F SUBSEQUENT PRENATAL CARE: CPT | Mod: CPTII,S$GLB,, | Performed by: PHYSICIAN ASSISTANT

## 2021-11-30 PROCEDURE — 90715 TDAP VACCINE GREATER THAN OR EQUAL TO 7YO IM: ICD-10-PCS | Mod: S$GLB,,, | Performed by: OBSTETRICS & GYNECOLOGY

## 2021-11-30 PROCEDURE — 90471 IMMUNIZATION ADMIN: CPT | Mod: S$GLB,,, | Performed by: OBSTETRICS & GYNECOLOGY

## 2021-11-30 PROCEDURE — 0502F PR SUBSEQUENT PRENATAL CARE: ICD-10-PCS | Mod: CPTII,S$GLB,, | Performed by: PHYSICIAN ASSISTANT

## 2021-12-03 ENCOUNTER — PROCEDURE VISIT (OUTPATIENT)
Dept: MATERNAL FETAL MEDICINE | Facility: CLINIC | Age: 36
End: 2021-12-03
Payer: COMMERCIAL

## 2021-12-03 DIAGNOSIS — D25.9 UTERINE FIBROIDS AFFECTING PREGNANCY IN THIRD TRIMESTER: ICD-10-CM

## 2021-12-03 DIAGNOSIS — O34.13 UTERINE FIBROIDS AFFECTING PREGNANCY IN THIRD TRIMESTER: ICD-10-CM

## 2021-12-03 DIAGNOSIS — Z36.89 ENCOUNTER FOR ULTRASOUND TO ASSESS FETAL GROWTH: ICD-10-CM

## 2021-12-03 DIAGNOSIS — Z3A.32 32 WEEKS GESTATION OF PREGNANCY: ICD-10-CM

## 2021-12-03 PROCEDURE — 76816 PR  US,PREGNANT UTERUS,F/U,TRANSABD APP: ICD-10-PCS | Mod: S$GLB,,, | Performed by: OBSTETRICS & GYNECOLOGY

## 2021-12-03 PROCEDURE — 76816 OB US FOLLOW-UP PER FETUS: CPT | Mod: S$GLB,,, | Performed by: OBSTETRICS & GYNECOLOGY

## 2021-12-15 ENCOUNTER — ROUTINE PRENATAL (OUTPATIENT)
Dept: OBSTETRICS AND GYNECOLOGY | Facility: CLINIC | Age: 36
End: 2021-12-15
Attending: OBSTETRICS & GYNECOLOGY
Payer: COMMERCIAL

## 2021-12-15 VITALS
DIASTOLIC BLOOD PRESSURE: 67 MMHG | WEIGHT: 158.19 LBS | SYSTOLIC BLOOD PRESSURE: 106 MMHG | BODY MASS INDEX: 31.95 KG/M2

## 2021-12-15 DIAGNOSIS — Z34.83 PRENATAL CARE, SUBSEQUENT PREGNANCY, THIRD TRIMESTER: ICD-10-CM

## 2021-12-15 PROCEDURE — 0502F SUBSEQUENT PRENATAL CARE: CPT | Mod: CPTII,S$GLB,, | Performed by: OBSTETRICS & GYNECOLOGY

## 2021-12-15 PROCEDURE — 0502F PR SUBSEQUENT PRENATAL CARE: ICD-10-PCS | Mod: CPTII,S$GLB,, | Performed by: OBSTETRICS & GYNECOLOGY

## 2021-12-15 RX ORDER — LANOLIN ALCOHOL/MO/W.PET/CERES
1 CREAM (GRAM) TOPICAL
COMMUNITY
End: 2022-06-30

## 2021-12-29 ENCOUNTER — ROUTINE PRENATAL (OUTPATIENT)
Dept: OBSTETRICS AND GYNECOLOGY | Facility: CLINIC | Age: 36
End: 2021-12-29
Payer: COMMERCIAL

## 2021-12-29 ENCOUNTER — LACTATION CONSULT (OUTPATIENT)
Dept: LACTATION | Facility: CLINIC | Age: 36
End: 2021-12-29
Payer: COMMERCIAL

## 2021-12-29 ENCOUNTER — LAB VISIT (OUTPATIENT)
Dept: LAB | Facility: OTHER | Age: 36
End: 2021-12-29
Attending: PHYSICIAN ASSISTANT
Payer: COMMERCIAL

## 2021-12-29 VITALS — WEIGHT: 161 LBS | BODY MASS INDEX: 32.52 KG/M2 | SYSTOLIC BLOOD PRESSURE: 114 MMHG | DIASTOLIC BLOOD PRESSURE: 68 MMHG

## 2021-12-29 DIAGNOSIS — Z71.9 HEALTH EDUCATION/COUNSELING: Primary | ICD-10-CM

## 2021-12-29 DIAGNOSIS — Z34.83 PRENATAL CARE, SUBSEQUENT PREGNANCY, THIRD TRIMESTER: ICD-10-CM

## 2021-12-29 DIAGNOSIS — Z34.83 PRENATAL CARE, SUBSEQUENT PREGNANCY, THIRD TRIMESTER: Primary | ICD-10-CM

## 2021-12-29 LAB
BASOPHILS # BLD AUTO: 0.02 K/UL (ref 0–0.2)
BASOPHILS NFR BLD: 0.3 % (ref 0–1.9)
DIFFERENTIAL METHOD: ABNORMAL
EOSINOPHIL # BLD AUTO: 0.1 K/UL (ref 0–0.5)
EOSINOPHIL NFR BLD: 1 % (ref 0–8)
ERYTHROCYTE [DISTWIDTH] IN BLOOD BY AUTOMATED COUNT: 15.3 % (ref 11.5–14.5)
HCT VFR BLD AUTO: 31.1 % (ref 37–48.5)
HGB BLD-MCNC: 9.6 G/DL (ref 12–16)
HIV 1+2 AB+HIV1 P24 AG SERPL QL IA: NEGATIVE
IMM GRANULOCYTES # BLD AUTO: 0.03 K/UL (ref 0–0.04)
IMM GRANULOCYTES NFR BLD AUTO: 0.4 % (ref 0–0.5)
LYMPHOCYTES # BLD AUTO: 1 K/UL (ref 1–4.8)
LYMPHOCYTES NFR BLD: 14.7 % (ref 18–48)
MCH RBC QN AUTO: 23.9 PG (ref 27–31)
MCHC RBC AUTO-ENTMCNC: 30.9 G/DL (ref 32–36)
MCV RBC AUTO: 77 FL (ref 82–98)
MONOCYTES # BLD AUTO: 0.5 K/UL (ref 0.3–1)
MONOCYTES NFR BLD: 6.5 % (ref 4–15)
NEUTROPHILS # BLD AUTO: 5.5 K/UL (ref 1.8–7.7)
NEUTROPHILS NFR BLD: 77.1 % (ref 38–73)
NRBC BLD-RTO: 0 /100 WBC
PLATELET # BLD AUTO: 170 K/UL (ref 150–450)
PMV BLD AUTO: 11.6 FL (ref 9.2–12.9)
RBC # BLD AUTO: 4.02 M/UL (ref 4–5.4)
WBC # BLD AUTO: 7.07 K/UL (ref 3.9–12.7)

## 2021-12-29 PROCEDURE — 87081 CULTURE SCREEN ONLY: CPT | Performed by: PHYSICIAN ASSISTANT

## 2021-12-29 PROCEDURE — 87389 HIV-1 AG W/HIV-1&-2 AB AG IA: CPT | Performed by: PHYSICIAN ASSISTANT

## 2021-12-29 PROCEDURE — 86592 SYPHILIS TEST NON-TREP QUAL: CPT | Performed by: PHYSICIAN ASSISTANT

## 2021-12-29 PROCEDURE — 0502F PR SUBSEQUENT PRENATAL CARE: ICD-10-PCS | Mod: CPTII,S$GLB,, | Performed by: PHYSICIAN ASSISTANT

## 2021-12-29 PROCEDURE — 85025 COMPLETE CBC W/AUTO DIFF WBC: CPT | Performed by: PHYSICIAN ASSISTANT

## 2021-12-29 PROCEDURE — 36415 COLL VENOUS BLD VENIPUNCTURE: CPT | Performed by: PHYSICIAN ASSISTANT

## 2021-12-29 PROCEDURE — 0502F SUBSEQUENT PRENATAL CARE: CPT | Mod: CPTII,S$GLB,, | Performed by: PHYSICIAN ASSISTANT

## 2022-01-02 LAB — BACTERIA SPEC AEROBE CULT: NORMAL

## 2022-01-03 ENCOUNTER — ROUTINE PRENATAL (OUTPATIENT)
Dept: OBSTETRICS AND GYNECOLOGY | Facility: CLINIC | Age: 37
End: 2022-01-03
Attending: OBSTETRICS & GYNECOLOGY
Payer: COMMERCIAL

## 2022-01-03 VITALS — SYSTOLIC BLOOD PRESSURE: 118 MMHG | WEIGHT: 160 LBS | DIASTOLIC BLOOD PRESSURE: 71 MMHG | BODY MASS INDEX: 32.32 KG/M2

## 2022-01-03 DIAGNOSIS — Z34.83 PRENATAL CARE, SUBSEQUENT PREGNANCY, THIRD TRIMESTER: Primary | ICD-10-CM

## 2022-01-03 LAB — RPR SER QL: NORMAL

## 2022-01-03 PROCEDURE — 0502F SUBSEQUENT PRENATAL CARE: CPT | Mod: CPTII,S$GLB,, | Performed by: OBSTETRICS & GYNECOLOGY

## 2022-01-03 PROCEDURE — 0502F PR SUBSEQUENT PRENATAL CARE: ICD-10-PCS | Mod: CPTII,S$GLB,, | Performed by: OBSTETRICS & GYNECOLOGY

## 2022-01-03 NOTE — PROGRESS NOTES
No cramping, no VB, good FM, no LOF  Patient reports increased pressure  H&P done today  Labor/bleeding precautions. Follow up in 1 week     HISTORY AND PHYSICAL                                                OBSTETRICS          Subjective:       Chayo Williamson is a 36 y.o.  female with IUP at 37w0d weeks gestation who c/o contractions. Contractions have been occuring Q min and have increased in intensity.  This IUP is complicated by prior C section x2  Patient denies contractions, denies vaginal bleeding, denies LOF.   Fetal Movement: normal.     PMHx: History reviewed. No pertinent past medical history.    PSHx:   Past Surgical History:   Procedure Laterality Date     SECTION       X 2       All: Review of patient's allergies indicates:  No Known Allergies    Meds: (Not in a hospital admission)      SH:   Social History     Socioeconomic History    Marital status:     Number of children: 2   Occupational History    Occupation:      Comment: [employer]   Tobacco Use    Smoking status: Never Smoker    Smokeless tobacco: Never Used   Substance and Sexual Activity    Alcohol use: Not Currently     Comment: social    Drug use: No    Sexual activity: Yes     Partners: Male     Birth control/protection: None   Social History Narrative    2 children: 1 son and 1 daughter       FH:   Family History   Problem Relation Age of Onset    Breast cancer Mother 45    Hypertension Maternal Grandmother     Stroke Maternal Grandmother     Diabetes Maternal Grandmother     Breast cancer Other     Colon cancer Paternal Aunt     Ovarian cancer Neg Hx     Cancer Neg Hx        OBHx:   OB History    Para Term  AB Living   4 0 0 0 1 2   SAB IAB Ectopic Multiple Live Births   1 0 0 0 0      # Outcome Date GA Lbr Antoine/2nd Weight Sex Delivery Anes PTL Lv   4 Current            3 SAB            2       CS-LTranv      1       CS-LTranv           Objective:       Wt 72.6 kg (160 lb)   LMP 11/22/2020   BMI 32.32 kg/m²     Vitals:    01/03/22 1514   Weight: 72.6 kg (160 lb)       General:   alert, appears stated age and cooperative   Lungs:   clear to auscultation bilaterally   Heart:   regular rate and rhythm, S1, S2 normal, no murmur, click, rub or gallop   Abdomen:  soft, non-tender; bowel sounds normal; no masses,  no organomegaly   Extremities negative edema, negative erythema   FHT:                  TOCO:    Presentations:    Cervix:     Dilation:     Effacement:     Station:      Consistency:     Position:    Sterile Speculum Exam:     EFW by Leopold's:     Lab Review  Blood Type O POS  GBBS: negative  Rubella: Immune  RPR: negative  HIV: negative  HepB: negative       Assessment:       Term  Gestation.  Prior c section x2    There are no hospital problems to display for this patient.         Plan:      Risks, benefits, alternatives and possible complications have been discussed in detail with the patient.   - Consents signed and to chart  - Admit to Labor and Delivery unit  -    - Epidural per Anesthesia  - Draw CBC, T&S  - Notify Staff      Post-Partum Hemorrhage risk -

## 2022-01-10 ENCOUNTER — ROUTINE PRENATAL (OUTPATIENT)
Dept: OBSTETRICS AND GYNECOLOGY | Facility: CLINIC | Age: 37
End: 2022-01-10
Attending: OBSTETRICS & GYNECOLOGY
Payer: COMMERCIAL

## 2022-01-10 VITALS — WEIGHT: 163 LBS | DIASTOLIC BLOOD PRESSURE: 72 MMHG | BODY MASS INDEX: 32.92 KG/M2 | SYSTOLIC BLOOD PRESSURE: 121 MMHG

## 2022-01-10 DIAGNOSIS — Z34.83 PRENATAL CARE, SUBSEQUENT PREGNANCY, THIRD TRIMESTER: Primary | ICD-10-CM

## 2022-01-10 PROCEDURE — 0502F PR SUBSEQUENT PRENATAL CARE: ICD-10-PCS | Mod: CPTII,S$GLB,, | Performed by: OBSTETRICS & GYNECOLOGY

## 2022-01-10 PROCEDURE — 0502F SUBSEQUENT PRENATAL CARE: CPT | Mod: CPTII,S$GLB,, | Performed by: OBSTETRICS & GYNECOLOGY

## 2022-01-10 NOTE — PROGRESS NOTES
No cramping, no VB, good FM, no LOF  Labor/bleeding precautions given. Patient scheduled for repeat C section- instructions reviewed.   Follow up in 1 week.

## 2022-01-11 ENCOUNTER — TELEPHONE (OUTPATIENT)
Dept: OBSTETRICS AND GYNECOLOGY | Facility: CLINIC | Age: 37
End: 2022-01-11
Payer: COMMERCIAL

## 2022-01-11 DIAGNOSIS — Z01.818 PREOPERATIVE TESTING: Primary | ICD-10-CM

## 2022-01-14 ENCOUNTER — HOSPITAL ENCOUNTER (INPATIENT)
Facility: OTHER | Age: 37
LOS: 2 days | Discharge: HOME | End: 2022-01-16
Attending: OBSTETRICS & GYNECOLOGY | Admitting: OBSTETRICS & GYNECOLOGY
Payer: COMMERCIAL

## 2022-01-14 ENCOUNTER — ANESTHESIA EVENT (OUTPATIENT)
Dept: OBSTETRICS AND GYNECOLOGY | Facility: OTHER | Age: 37
End: 2022-01-14
Payer: COMMERCIAL

## 2022-01-14 DIAGNOSIS — Z3A.38 38 WEEKS GESTATION OF PREGNANCY: ICD-10-CM

## 2022-01-14 DIAGNOSIS — O47.9 UTERINE CONTRACTIONS DURING PREGNANCY: ICD-10-CM

## 2022-01-14 DIAGNOSIS — Z98.891 STATUS POST REPEAT LOW TRANSVERSE CESAREAN SECTION: Primary | ICD-10-CM

## 2022-01-14 DIAGNOSIS — Z37.9 NORMAL LABOR: ICD-10-CM

## 2022-01-14 PROBLEM — O09.523 MULTIGRAVIDA OF ADVANCED MATERNAL AGE IN THIRD TRIMESTER: Status: ACTIVE | Noted: 2022-01-14

## 2022-01-14 LAB
ABO + RH BLD: NORMAL
BASOPHILS # BLD AUTO: 0.01 K/UL (ref 0–0.2)
BASOPHILS NFR BLD: 0.1 % (ref 0–1.9)
BLD GP AB SCN CELLS X3 SERPL QL: NORMAL
DIFFERENTIAL METHOD: ABNORMAL
EOSINOPHIL # BLD AUTO: 0 K/UL (ref 0–0.5)
EOSINOPHIL NFR BLD: 0.4 % (ref 0–8)
ERYTHROCYTE [DISTWIDTH] IN BLOOD BY AUTOMATED COUNT: 15.9 % (ref 11.5–14.5)
HCT VFR BLD AUTO: 30 % (ref 37–48.5)
HGB BLD-MCNC: 9.4 G/DL (ref 12–16)
IMM GRANULOCYTES # BLD AUTO: 0.06 K/UL (ref 0–0.04)
IMM GRANULOCYTES NFR BLD AUTO: 0.8 % (ref 0–0.5)
LYMPHOCYTES # BLD AUTO: 1.1 K/UL (ref 1–4.8)
LYMPHOCYTES NFR BLD: 15 % (ref 18–48)
MCH RBC QN AUTO: 23.9 PG (ref 27–31)
MCHC RBC AUTO-ENTMCNC: 31.3 G/DL (ref 32–36)
MCV RBC AUTO: 76 FL (ref 82–98)
MONOCYTES # BLD AUTO: 0.6 K/UL (ref 0.3–1)
MONOCYTES NFR BLD: 7.7 % (ref 4–15)
NEUTROPHILS # BLD AUTO: 5.5 K/UL (ref 1.8–7.7)
NEUTROPHILS NFR BLD: 76 % (ref 38–73)
NRBC BLD-RTO: 0 /100 WBC
PLATELET # BLD AUTO: 165 K/UL (ref 150–450)
PMV BLD AUTO: 11.5 FL (ref 9.2–12.9)
RBC # BLD AUTO: 3.93 M/UL (ref 4–5.4)
SARS-COV-2 RDRP RESP QL NAA+PROBE: NEGATIVE
WBC # BLD AUTO: 7.26 K/UL (ref 3.9–12.7)

## 2022-01-14 PROCEDURE — 36004725 HC OB OR TIME LEV III - EA ADD 15 MIN: Performed by: OBSTETRICS & GYNECOLOGY

## 2022-01-14 PROCEDURE — 25000003 PHARM REV CODE 250: Performed by: STUDENT IN AN ORGANIZED HEALTH CARE EDUCATION/TRAINING PROGRAM

## 2022-01-14 PROCEDURE — 59025 FETAL NON-STRESS TEST: CPT | Mod: 26,,, | Performed by: OBSTETRICS & GYNECOLOGY

## 2022-01-14 PROCEDURE — 99285 EMERGENCY DEPT VISIT HI MDM: CPT | Mod: 25

## 2022-01-14 PROCEDURE — U0002 COVID-19 LAB TEST NON-CDC: HCPCS

## 2022-01-14 PROCEDURE — 63600175 PHARM REV CODE 636 W HCPCS

## 2022-01-14 PROCEDURE — 25000003 PHARM REV CODE 250

## 2022-01-14 PROCEDURE — 59510 PR FULL ROUT OBSTE CARE,CESAREAN DELIV: ICD-10-PCS | Mod: ,,, | Performed by: OBSTETRICS & GYNECOLOGY

## 2022-01-14 PROCEDURE — 36004724 HC OB OR TIME LEV III - 1ST 15 MIN: Performed by: OBSTETRICS & GYNECOLOGY

## 2022-01-14 PROCEDURE — 63600175 PHARM REV CODE 636 W HCPCS: Performed by: STUDENT IN AN ORGANIZED HEALTH CARE EDUCATION/TRAINING PROGRAM

## 2022-01-14 PROCEDURE — 37000008 HC ANESTHESIA 1ST 15 MINUTES: Performed by: OBSTETRICS & GYNECOLOGY

## 2022-01-14 PROCEDURE — 59510 CESAREAN DELIVERY: CPT | Mod: ,,, | Performed by: OBSTETRICS & GYNECOLOGY

## 2022-01-14 PROCEDURE — 71000033 HC RECOVERY, INTIAL HOUR: Performed by: OBSTETRICS & GYNECOLOGY

## 2022-01-14 PROCEDURE — 86901 BLOOD TYPING SEROLOGIC RH(D): CPT

## 2022-01-14 PROCEDURE — 99283 PR EMERGENCY DEPT VISIT,LEVEL III: ICD-10-PCS | Mod: 25,,, | Performed by: OBSTETRICS & GYNECOLOGY

## 2022-01-14 PROCEDURE — 99283 EMERGENCY DEPT VISIT LOW MDM: CPT | Mod: 25,,, | Performed by: OBSTETRICS & GYNECOLOGY

## 2022-01-14 PROCEDURE — 85025 COMPLETE CBC W/AUTO DIFF WBC: CPT

## 2022-01-14 PROCEDURE — 59510 PRA FULL ROUT OBSTE CARE,CESAREAN DELIV: ICD-10-PCS | Mod: ,,, | Performed by: ANESTHESIOLOGY

## 2022-01-14 PROCEDURE — 37000009 HC ANESTHESIA EA ADD 15 MINS: Performed by: OBSTETRICS & GYNECOLOGY

## 2022-01-14 PROCEDURE — 59025 FETAL NON-STRESS TEST: CPT

## 2022-01-14 PROCEDURE — 59025 PR FETAL 2N-STRESS TEST: ICD-10-PCS | Mod: 26,,, | Performed by: OBSTETRICS & GYNECOLOGY

## 2022-01-14 PROCEDURE — 71000039 HC RECOVERY, EACH ADD'L HOUR: Performed by: OBSTETRICS & GYNECOLOGY

## 2022-01-14 PROCEDURE — 11000001 HC ACUTE MED/SURG PRIVATE ROOM

## 2022-01-14 PROCEDURE — 59510 CESAREAN DELIVERY: CPT | Mod: ,,, | Performed by: ANESTHESIOLOGY

## 2022-01-14 RX ORDER — FENTANYL CITRATE 50 UG/ML
INJECTION, SOLUTION INTRAMUSCULAR; INTRAVENOUS
Status: DISCONTINUED | OUTPATIENT
Start: 2022-01-14 | End: 2022-01-14

## 2022-01-14 RX ORDER — ONDANSETRON 2 MG/ML
4 INJECTION INTRAMUSCULAR; INTRAVENOUS EVERY 6 HOURS PRN
Status: DISCONTINUED | OUTPATIENT
Start: 2022-01-14 | End: 2022-01-16 | Stop reason: HOSPADM

## 2022-01-14 RX ORDER — CARBOPROST TROMETHAMINE 250 UG/ML
250 INJECTION, SOLUTION INTRAMUSCULAR
Status: DISCONTINUED | OUTPATIENT
Start: 2022-01-14 | End: 2022-01-14

## 2022-01-14 RX ORDER — SODIUM CHLORIDE, SODIUM LACTATE, POTASSIUM CHLORIDE, CALCIUM CHLORIDE 600; 310; 30; 20 MG/100ML; MG/100ML; MG/100ML; MG/100ML
INJECTION, SOLUTION INTRAVENOUS CONTINUOUS
Status: DISCONTINUED | OUTPATIENT
Start: 2022-01-14 | End: 2022-01-16 | Stop reason: HOSPADM

## 2022-01-14 RX ORDER — NALOXONE HCL 0.4 MG/ML
0.04 VIAL (ML) INJECTION
Status: CANCELLED | OUTPATIENT
Start: 2022-01-14

## 2022-01-14 RX ORDER — IBUPROFEN 400 MG/1
800 TABLET ORAL
Status: DISCONTINUED | OUTPATIENT
Start: 2022-01-15 | End: 2022-01-16 | Stop reason: HOSPADM

## 2022-01-14 RX ORDER — MISOPROSTOL 200 UG/1
800 TABLET ORAL
Status: DISCONTINUED | OUTPATIENT
Start: 2022-01-14 | End: 2022-01-14

## 2022-01-14 RX ORDER — SIMETHICONE 80 MG
1 TABLET,CHEWABLE ORAL EVERY 6 HOURS PRN
Status: DISCONTINUED | OUTPATIENT
Start: 2022-01-14 | End: 2022-01-16 | Stop reason: HOSPADM

## 2022-01-14 RX ORDER — DEXAMETHASONE SODIUM PHOSPHATE 4 MG/ML
INJECTION, SOLUTION INTRA-ARTICULAR; INTRALESIONAL; INTRAMUSCULAR; INTRAVENOUS; SOFT TISSUE
Status: DISCONTINUED | OUTPATIENT
Start: 2022-01-14 | End: 2022-01-14

## 2022-01-14 RX ORDER — MORPHINE SULFATE 1 MG/ML
INJECTION, SOLUTION EPIDURAL; INTRATHECAL; INTRAVENOUS
Status: DISCONTINUED | OUTPATIENT
Start: 2022-01-14 | End: 2022-01-14

## 2022-01-14 RX ORDER — MIDAZOLAM HYDROCHLORIDE 1 MG/ML
INJECTION, SOLUTION INTRAMUSCULAR; INTRAVENOUS
Status: DISCONTINUED | OUTPATIENT
Start: 2022-01-14 | End: 2022-01-14

## 2022-01-14 RX ORDER — AMOXICILLIN 250 MG
1 CAPSULE ORAL NIGHTLY PRN
Status: DISCONTINUED | OUTPATIENT
Start: 2022-01-14 | End: 2022-01-16 | Stop reason: HOSPADM

## 2022-01-14 RX ORDER — OXYTOCIN/RINGER'S LACTATE 30/500 ML
PLASTIC BAG, INJECTION (ML) INTRAVENOUS
Status: COMPLETED
Start: 2022-01-14 | End: 2022-01-14

## 2022-01-14 RX ORDER — ACETAMINOPHEN 325 MG/1
650 TABLET ORAL
Status: DISCONTINUED | OUTPATIENT
Start: 2022-01-14 | End: 2022-01-16 | Stop reason: HOSPADM

## 2022-01-14 RX ORDER — CEFAZOLIN SODIUM 2 G/50ML
2 SOLUTION INTRAVENOUS
Status: DISCONTINUED | OUTPATIENT
Start: 2022-01-14 | End: 2022-01-16 | Stop reason: HOSPADM

## 2022-01-14 RX ORDER — KETOROLAC TROMETHAMINE 30 MG/ML
30 INJECTION, SOLUTION INTRAMUSCULAR; INTRAVENOUS
Status: COMPLETED | OUTPATIENT
Start: 2022-01-14 | End: 2022-01-15

## 2022-01-14 RX ORDER — OXYCODONE HYDROCHLORIDE 5 MG/1
10 TABLET ORAL EVERY 4 HOURS PRN
Status: DISCONTINUED | OUTPATIENT
Start: 2022-01-14 | End: 2022-01-16 | Stop reason: HOSPADM

## 2022-01-14 RX ORDER — ONDANSETRON 8 MG/1
8 TABLET, ORALLY DISINTEGRATING ORAL EVERY 8 HOURS PRN
Status: DISCONTINUED | OUTPATIENT
Start: 2022-01-14 | End: 2022-01-16 | Stop reason: HOSPADM

## 2022-01-14 RX ORDER — METHYLERGONOVINE MALEATE 0.2 MG/ML
200 INJECTION INTRAVENOUS ONCE AS NEEDED
Status: DISCONTINUED | OUTPATIENT
Start: 2022-01-14 | End: 2022-01-16 | Stop reason: HOSPADM

## 2022-01-14 RX ORDER — OXYTOCIN/RINGER'S LACTATE 30/500 ML
95 PLASTIC BAG, INJECTION (ML) INTRAVENOUS ONCE
Status: DISCONTINUED | OUTPATIENT
Start: 2022-01-14 | End: 2022-01-14

## 2022-01-14 RX ORDER — OXYTOCIN/RINGER'S LACTATE 30/500 ML
95 PLASTIC BAG, INJECTION (ML) INTRAVENOUS CONTINUOUS
Status: ACTIVE | OUTPATIENT
Start: 2022-01-14 | End: 2022-01-14

## 2022-01-14 RX ORDER — FAMOTIDINE 10 MG/ML
20 INJECTION INTRAVENOUS
Status: COMPLETED | OUTPATIENT
Start: 2022-01-14 | End: 2022-01-14

## 2022-01-14 RX ORDER — CARBOPROST TROMETHAMINE 250 UG/ML
250 INJECTION, SOLUTION INTRAMUSCULAR
Status: DISCONTINUED | OUTPATIENT
Start: 2022-01-14 | End: 2022-01-16 | Stop reason: HOSPADM

## 2022-01-14 RX ORDER — ONDANSETRON 2 MG/ML
4 INJECTION INTRAMUSCULAR; INTRAVENOUS EVERY 12 HOURS PRN
Status: CANCELLED | OUTPATIENT
Start: 2022-01-14

## 2022-01-14 RX ORDER — ONDANSETRON 2 MG/ML
INJECTION INTRAMUSCULAR; INTRAVENOUS
Status: DISCONTINUED | OUTPATIENT
Start: 2022-01-14 | End: 2022-01-14

## 2022-01-14 RX ORDER — ACETAMINOPHEN 500 MG
1000 TABLET ORAL
Status: COMPLETED | OUTPATIENT
Start: 2022-01-14 | End: 2022-01-14

## 2022-01-14 RX ORDER — DOCUSATE SODIUM 100 MG/1
200 CAPSULE, LIQUID FILLED ORAL 2 TIMES DAILY
Status: DISCONTINUED | OUTPATIENT
Start: 2022-01-14 | End: 2022-01-16 | Stop reason: HOSPADM

## 2022-01-14 RX ORDER — MISOPROSTOL 200 UG/1
800 TABLET ORAL ONCE AS NEEDED
Status: DISCONTINUED | OUTPATIENT
Start: 2022-01-14 | End: 2022-01-16 | Stop reason: HOSPADM

## 2022-01-14 RX ORDER — FAMOTIDINE 10 MG/ML
20 INJECTION INTRAVENOUS
Status: DISCONTINUED | OUTPATIENT
Start: 2022-01-14 | End: 2022-01-14

## 2022-01-14 RX ORDER — OXYTOCIN 10 [USP'U]/ML
INJECTION, SOLUTION INTRAMUSCULAR; INTRAVENOUS
Status: DISCONTINUED | OUTPATIENT
Start: 2022-01-14 | End: 2022-01-14

## 2022-01-14 RX ORDER — SODIUM CITRATE AND CITRIC ACID MONOHYDRATE 334; 500 MG/5ML; MG/5ML
30 SOLUTION ORAL
Status: COMPLETED | OUTPATIENT
Start: 2022-01-14 | End: 2022-01-14

## 2022-01-14 RX ORDER — SODIUM CITRATE AND CITRIC ACID MONOHYDRATE 334; 500 MG/5ML; MG/5ML
30 SOLUTION ORAL
Status: DISCONTINUED | OUTPATIENT
Start: 2022-01-14 | End: 2022-01-14

## 2022-01-14 RX ORDER — DIPHENHYDRAMINE HCL 25 MG
25 CAPSULE ORAL EVERY 6 HOURS PRN
Status: DISCONTINUED | OUTPATIENT
Start: 2022-01-14 | End: 2022-01-16 | Stop reason: HOSPADM

## 2022-01-14 RX ORDER — CEFAZOLIN SODIUM 2 G/50ML
2 SOLUTION INTRAVENOUS ONCE AS NEEDED
Status: DISCONTINUED | OUTPATIENT
Start: 2022-01-14 | End: 2022-01-14

## 2022-01-14 RX ORDER — PROCHLORPERAZINE EDISYLATE 5 MG/ML
5 INJECTION INTRAMUSCULAR; INTRAVENOUS EVERY 6 HOURS PRN
Status: CANCELLED | OUTPATIENT
Start: 2022-01-14

## 2022-01-14 RX ORDER — OXYTOCIN/RINGER'S LACTATE 30/500 ML
334 PLASTIC BAG, INJECTION (ML) INTRAVENOUS ONCE
Status: DISCONTINUED | OUTPATIENT
Start: 2022-01-14 | End: 2022-01-14

## 2022-01-14 RX ORDER — SODIUM CHLORIDE, SODIUM LACTATE, POTASSIUM CHLORIDE, CALCIUM CHLORIDE 600; 310; 30; 20 MG/100ML; MG/100ML; MG/100ML; MG/100ML
INJECTION, SOLUTION INTRAVENOUS CONTINUOUS
Status: DISCONTINUED | OUTPATIENT
Start: 2022-01-14 | End: 2022-01-14

## 2022-01-14 RX ORDER — BUPIVACAINE HYDROCHLORIDE 7.5 MG/ML
INJECTION, SOLUTION INTRASPINAL
Status: DISCONTINUED | OUTPATIENT
Start: 2022-01-14 | End: 2022-01-14

## 2022-01-14 RX ORDER — OXYTOCIN/RINGER'S LACTATE 30/500 ML
95 PLASTIC BAG, INJECTION (ML) INTRAVENOUS CONTINUOUS
Status: CANCELLED | OUTPATIENT
Start: 2022-01-14

## 2022-01-14 RX ORDER — DIPHENHYDRAMINE HYDROCHLORIDE 50 MG/ML
12.5 INJECTION INTRAMUSCULAR; INTRAVENOUS
Status: COMPLETED | OUTPATIENT
Start: 2022-01-14 | End: 2022-01-14

## 2022-01-14 RX ORDER — TRANEXAMIC ACID 100 MG/ML
1000 INJECTION, SOLUTION INTRAVENOUS ONCE AS NEEDED
Status: DISCONTINUED | OUTPATIENT
Start: 2022-01-14 | End: 2022-01-16 | Stop reason: HOSPADM

## 2022-01-14 RX ORDER — LANOLIN ALCOHOL/MO/W.PET/CERES
1 CREAM (GRAM) TOPICAL
Status: DISCONTINUED | OUTPATIENT
Start: 2022-01-15 | End: 2022-01-16 | Stop reason: HOSPADM

## 2022-01-14 RX ORDER — NALBUPHINE HYDROCHLORIDE 10 MG/ML
5 INJECTION, SOLUTION INTRAMUSCULAR; INTRAVENOUS; SUBCUTANEOUS ONCE AS NEEDED
Status: DISCONTINUED | OUTPATIENT
Start: 2022-01-14 | End: 2022-01-16 | Stop reason: HOSPADM

## 2022-01-14 RX ORDER — SODIUM CHLORIDE, SODIUM LACTATE, POTASSIUM CHLORIDE, CALCIUM CHLORIDE 600; 310; 30; 20 MG/100ML; MG/100ML; MG/100ML; MG/100ML
INJECTION, SOLUTION INTRAVENOUS CONTINUOUS PRN
Status: DISCONTINUED | OUTPATIENT
Start: 2022-01-14 | End: 2022-01-14

## 2022-01-14 RX ORDER — PRENATAL WITH FERROUS FUM AND FOLIC ACID 3080; 920; 120; 400; 22; 1.84; 3; 20; 10; 1; 12; 200; 27; 25; 2 [IU]/1; [IU]/1; MG/1; [IU]/1; MG/1; MG/1; MG/1; MG/1; MG/1; MG/1; UG/1; MG/1; MG/1; MG/1; MG/1
1 TABLET ORAL DAILY
Status: DISCONTINUED | OUTPATIENT
Start: 2022-01-15 | End: 2022-01-16 | Stop reason: HOSPADM

## 2022-01-14 RX ORDER — HYDROCORTISONE 25 MG/G
CREAM TOPICAL 3 TIMES DAILY PRN
Status: DISCONTINUED | OUTPATIENT
Start: 2022-01-14 | End: 2022-01-16 | Stop reason: HOSPADM

## 2022-01-14 RX ORDER — PROCHLORPERAZINE EDISYLATE 5 MG/ML
5 INJECTION INTRAMUSCULAR; INTRAVENOUS EVERY 6 HOURS PRN
Status: DISCONTINUED | OUTPATIENT
Start: 2022-01-14 | End: 2022-01-16 | Stop reason: HOSPADM

## 2022-01-14 RX ORDER — METHYLERGONOVINE MALEATE 0.2 MG/ML
200 INJECTION INTRAVENOUS
Status: DISCONTINUED | OUTPATIENT
Start: 2022-01-14 | End: 2022-01-14

## 2022-01-14 RX ORDER — OXYCODONE HYDROCHLORIDE 5 MG/1
5 TABLET ORAL EVERY 4 HOURS PRN
Status: DISCONTINUED | OUTPATIENT
Start: 2022-01-14 | End: 2022-01-16 | Stop reason: HOSPADM

## 2022-01-14 RX ADMIN — FENTANYL CITRATE 50 MCG: 50 INJECTION, SOLUTION INTRAMUSCULAR; INTRAVENOUS at 06:01

## 2022-01-14 RX ADMIN — Medication 95 MILLI-UNITS/MIN: at 07:01

## 2022-01-14 RX ADMIN — OXYTOCIN 3 UNITS: 10 INJECTION, SOLUTION INTRAMUSCULAR; INTRAVENOUS at 06:01

## 2022-01-14 RX ADMIN — BUPIVACAINE HYDROCHLORIDE IN DEXTROSE 1.6 ML: 7.5 INJECTION, SOLUTION SUBARACHNOID at 05:01

## 2022-01-14 RX ADMIN — FAMOTIDINE 20 MG: 10 INJECTION INTRAVENOUS at 04:01

## 2022-01-14 RX ADMIN — PHENYLEPHRINE HYDROCHLORIDE 10 MCG/MIN: 10 INJECTION INTRAVENOUS at 05:01

## 2022-01-14 RX ADMIN — SODIUM CHLORIDE, SODIUM LACTATE, POTASSIUM CHLORIDE, AND CALCIUM CHLORIDE: 600; 310; 30; 20 INJECTION, SOLUTION INTRAVENOUS at 05:01

## 2022-01-14 RX ADMIN — MIDAZOLAM HYDROCHLORIDE 1 MG: 1 INJECTION, SOLUTION INTRAMUSCULAR; INTRAVENOUS at 05:01

## 2022-01-14 RX ADMIN — ONDANSETRON HYDROCHLORIDE 4 MG: 2 INJECTION INTRAMUSCULAR; INTRAVENOUS at 05:01

## 2022-01-14 RX ADMIN — ACETAMINOPHEN 650 MG: 325 TABLET, FILM COATED ORAL at 11:01

## 2022-01-14 RX ADMIN — FENTANYL CITRATE 10 MCG: 50 INJECTION, SOLUTION INTRAMUSCULAR; INTRAVENOUS at 05:01

## 2022-01-14 RX ADMIN — DEXAMETHASONE SODIUM PHOSPHATE 4 MG: 4 INJECTION, SOLUTION INTRAMUSCULAR; INTRAVENOUS at 05:01

## 2022-01-14 RX ADMIN — DIPHENHYDRAMINE HYDROCHLORIDE 12.5 MG: 50 INJECTION INTRAMUSCULAR; INTRAVENOUS at 08:01

## 2022-01-14 RX ADMIN — KETOROLAC TROMETHAMINE 30 MG: 30 INJECTION, SOLUTION INTRAMUSCULAR at 07:01

## 2022-01-14 RX ADMIN — PHENYLEPHRINE HYDROCHLORIDE 50 MCG/MIN: 10 INJECTION INTRAVENOUS at 05:01

## 2022-01-14 RX ADMIN — PHENYLEPHRINE HYDROCHLORIDE 15 MCG/MIN: 10 INJECTION INTRAVENOUS at 05:01

## 2022-01-14 RX ADMIN — SODIUM CITRATE AND CITRIC ACID MONOHYDRATE 30 ML: 500; 334 SOLUTION ORAL at 04:01

## 2022-01-14 RX ADMIN — DIPHENHYDRAMINE HYDROCHLORIDE 12.5 MG: 50 INJECTION INTRAMUSCULAR; INTRAVENOUS at 09:01

## 2022-01-14 RX ADMIN — SODIUM CHLORIDE 2 G: 9 INJECTION, SOLUTION INTRAVENOUS at 05:01

## 2022-01-14 RX ADMIN — MORPHINE SULFATE 0.1 MG: 1 INJECTION EPIDURAL; INTRATHECAL; INTRAVENOUS at 05:01

## 2022-01-14 RX ADMIN — ACETAMINOPHEN 1000 MG: 500 TABLET, FILM COATED ORAL at 04:01

## 2022-01-14 RX ADMIN — PHENYLEPHRINE HYDROCHLORIDE 33.33 MCG/MIN: 10 INJECTION INTRAVENOUS at 05:01

## 2022-01-14 RX ADMIN — OXYCODONE 10 MG: 5 TABLET ORAL at 08:01

## 2022-01-14 RX ADMIN — DIPHENHYDRAMINE HYDROCHLORIDE 25 MG: 25 CAPSULE ORAL at 11:01

## 2022-01-14 NOTE — ED NOTES
Pt ambulated to [facility] room 3. Pt stated she fell on her side at 1000 this AM while walking her dog. She reports +FM, denies LOF, vaginal bleeding, and ctx.

## 2022-01-14 NOTE — H&P
HISTORY AND PHYSICAL                                                OBSTETRICS          Subjective:       Chayo Williamson is a 36 y.o.  female with IUP at 38w4d weeks gestation who presented to OB ED status post fall without abdominal trauma at 1000 AM 2022. While in OB ED, patient noted to be regularly ebonie and  SVE change from 0.5 cm to 2 cm.     This IUP is complicated by h/o c/s x2, migraines, hypothyroidism, and anemia.  Patient reports contractions, denies vaginal bleeding, denies LOF.   Fetal Movement: normal.     Review of Systems   Constitutional: Negative for appetite change, diaphoresis, fever and unexpected weight change.   Eyes: Negative for visual disturbance.   Respiratory: Negative for cough.    Cardiovascular: Negative for chest pain and leg swelling.   Gastrointestinal: Positive for abdominal pain. Negative for constipation, diarrhea, nausea and vomiting.   Genitourinary: Negative for vaginal bleeding.   Musculoskeletal: Negative for arthralgias and back pain.   Integumentary:  Negative for nipple discharge.   Neurological: Negative for headaches.   Psychiatric/Behavioral: The patient is not nervous/anxious.    Breast: Negative for nipple discharge      PMHx: No past medical history on file.    PSHx:   Past Surgical History:   Procedure Laterality Date     SECTION       X 2       All: Review of patient's allergies indicates:  No Known Allergies    Meds:   Medications Prior to Admission   Medication Sig Dispense Refill Last Dose    ferrous sulfate (FEOSOL) Tab tablet Take 1 tablet by mouth daily with breakfast.       PRENATAL 105-IRON-FOLIC AC-DHA ORAL Take by mouth.          SH:   Social History     Socioeconomic History    Marital status:     Number of children: 2   Occupational History    Occupation:      Comment: [employer]   Tobacco Use    Smoking status: Never Smoker    Smokeless tobacco: Never Used  "  Substance and Sexual Activity    Alcohol use: Not Currently     Comment: social    Drug use: No    Sexual activity: Yes     Partners: Male     Birth control/protection: None   Social History Narrative    2 children: 1 son and 1 daughter       FH:   Family History   Problem Relation Age of Onset    Breast cancer Mother 45    Hypertension Maternal Grandmother     Stroke Maternal Grandmother     Diabetes Maternal Grandmother     Breast cancer Other     Colon cancer Paternal Aunt     Ovarian cancer Neg Hx     Cancer Neg Hx        OBHx:   OB History    Para Term  AB Living   6 2 2 0 1 2   SAB IAB Ectopic Multiple Live Births   1 0 0 0 0      # Outcome Date GA Lbr Antoine/2nd Weight Sex Delivery Anes PTL Lv   6 Current            5 Term            4 Term            3 SAB            2       CS-LTranv      1       CS-LTranv          Objective:       BP (!) 123/58   Pulse 85   Temp 98.1 °F (36.7 °C) (Oral)   Resp 18   Ht 4' 11" (1.499 m)   Wt 73.9 kg (162 lb 14.7 oz)   LMP 2020   SpO2 100%   Breastfeeding No   BMI 32.91 kg/m²     Vitals:    22 1232 22 1234 22 1236 22 1237   BP:    (!) 123/58   Pulse: 90 80 85 85   Resp:       Temp:       TempSrc:       SpO2:  100%     Weight:       Height:           General:   alert, appears stated age and cooperative, no apparent distress   HENT:  normocephalic, atraumatic   Eyes:  extraocular movements and conjunctivae normal   Neck:  supple, range of motion normal   Lungs:   no respiratory distress   Heart:   regular rate   Abdomen:  soft, non-tender, non-distended but gravid, no rebound or guarding    Extremities negative edema, negative erythema   FHT: Baseline 145, moderate BTBV, +accels, -decels;  Cat 1 (reassuring)                 TOCO: Q 2-5 minutes   Presentations: cephalic by ultrasound   Cervix:     Dilation: 2cm    Effacement: 50%    Station:  -3     Lab Review  Blood Type O POS  GBBS: negative  Rubella: " Immune  RPR: negative  HIV: negative  HepB: negative       Assessment:       38w4d weeks gestation with normal labor    Active Hospital Problems    Diagnosis  POA    History of 2  sections [Z98.891]  Not Applicable    Normal labor [O80, Z37.9]  Not Applicable    Multigravida of advanced maternal age in third trimester [O09.523]  Yes      Resolved Hospital Problems   No resolved problems to display.          Plan:     1. Latent labor, history of    - Patient found to be making cervical change in setting of contractions  - Given history of previous CS x 2, will proceed with repeat CS  - Risks, benefits, alternatives and possible complications have been discussed in detail with the patient.   - Consents signed in clinic and in media  - Admit to Labor and Delivery unit  - Spinal per Anesthesia  - Draw CBC, T&S  - Notify Staff  - Post-Partum Hemorrhage risk - medium    2. Anemia  - H/h 9.4/30.0 on admit  - Will need iron/colace postpartum    3. Hx of CS   - History of  x 2   - Plan for repeat LTCS     4. Hypothyroidism:   - No meds     5. Migraines:   - No meds     Jocelyn Hernandez MD/MPH  OB/GYN PGY-1   [facility]

## 2022-01-14 NOTE — ED PROVIDER NOTES
Encounter Date: 2022       History     Chief Complaint   Patient presents with    Fall     HPI   Chayo Williamson is a 36 y.o. [id number] at 38w4d presents complaining of fall without abdominal trauma at 1000. Denies loss of consciousness. Reports feeling increased pressure.     This IUP is complicated by h/o c/s x2, migraines, hypothyroidism, and anemia.  Patient reports contractions, denies vaginal bleeding, denies LOF.   Fetal Movement: normal.   Review of patient's allergies indicates:  No Known Allergies  No past medical history on file.  Past Surgical History:   Procedure Laterality Date     SECTION       X 2     Family History   Problem Relation Age of Onset    Breast cancer Mother 45    Hypertension Maternal Grandmother     Stroke Maternal Grandmother     Diabetes Maternal Grandmother     Breast cancer Other     Colon cancer Paternal Aunt     Ovarian cancer Neg Hx     Cancer Neg Hx      Social History     Tobacco Use    Smoking status: Never Smoker    Smokeless tobacco: Never Used   Substance Use Topics    Alcohol use: Not Currently     Comment: social    Drug use: No     Review of Systems   Constitutional: Negative for chills, fatigue and fever.   Eyes: Negative for photophobia and visual disturbance.   Respiratory: Negative for cough and shortness of breath.    Cardiovascular: Negative for chest pain, palpitations and leg swelling.   Gastrointestinal: Positive for abdominal pain. Negative for nausea and vomiting.   Genitourinary: Positive for vaginal pain (increased vaginal pressure). Negative for difficulty urinating, dysuria, flank pain, frequency, hematuria, pelvic pain, urgency, vaginal bleeding and vaginal discharge.   Neurological: Negative for headaches.       Physical Exam     Initial Vitals   BP Pulse Resp Temp SpO2   22 1056 22 1059 22 1056 22 1056 22 1100   124/68 99 18 98.1 °F (36.7 °C) 98 %      MAP       --                 Physical Exam    Vitals reviewed.  Constitutional: She appears well-developed and well-nourished.   HENT:   Head: Normocephalic.   Eyes: Conjunctivae are normal.   Neck:   Normal range of motion.  Cardiovascular: Normal rate.   Pulmonary/Chest: Effort normal. No respiratory distress.   Abdominal: Abdomen is soft. There is abdominal tenderness.   No abdominal bruising noted    No right CVA tenderness.  No left CVA tenderness. There is no rebound and no guarding.   Genitourinary:    Uterus normal.     Musculoskeletal:         General: No tenderness or edema. Normal range of motion.      Cervical back: Normal range of motion.     Neurological: She is alert and oriented to person, place, and time.   Skin: Skin is warm and dry. Capillary refill takes less than 2 seconds.   Psychiatric: She has a normal mood and affect. Her behavior is normal. Judgment and thought content normal.     OB LABOR EXAM:   Pre-Term Labor: No.   Membranes ruptured: No.   Method: Sterile vaginal exam per MD.   Vaginal Bleeding: none present.     Dilatation: 2.   Station: -3.   Effacement: 50%.   Amniotic Fluid Color: no fluid.   Amniotic Fluid Amount: none noted.   Comments: Patient noted to be 0.5/50/-3 >> changed to 2/50/-3 after 4 hours s/p fall        ED Course   Fetal non-stress test    Date/Time: 1/14/2022 5:08 PM  Performed by: Jocelyn Hernandez MD  Authorized by: Maria Esther Chao MD     Nonstress Test:     Variability:  6-25 BPM    Decelerations:  None    Accelerations:  15 bpm    Baseline:  145    Contractions:  Regular    Contraction Frequency:  Ctx q2-5  Biophysical Profile:     Nonstress Test Interpretation: reactive      Overall Impression:  Reassuring  Post-procedure:     Patient tolerance:  Patient tolerated the procedure well with no immediate complications      Labs Reviewed   CBC W/ AUTO DIFFERENTIAL - Abnormal; Notable for the following components:       Result Value    RBC 3.93 (*)     Hemoglobin 9.4 (*)     Hematocrit 30.0 (*)      MCV 76 (*)     MCH 23.9 (*)     MCHC 31.3 (*)     RDW 15.9 (*)     Immature Granulocytes 0.8 (*)     Immature Grans (Abs) 0.06 (*)     Gran % 76.0 (*)     Lymph % 15.0 (*)     All other components within normal limits   SARS-COV-2 RNA AMPLIFICATION, QUAL   TYPE & SCREEN          Imaging Results    None          Medications   lactated ringers infusion (has no administration in time range)   cefazolin (ANCEF) 2 gram in dextrose 5% 50 mL IVPB (premix) (has no administration in time range)   ferrous sulfate tablet 1 each (has no administration in time range)   acetaminophen tablet 1,000 mg (1,000 mg Oral Given 22)   sodium citrate-citric acid 500-334 mg/5 ml solution 30 mL (30 mLs Oral Given 22)   famotidine (PF) injection 20 mg (20 mg Intravenous Given 22)     Medical Decision Making:   ED Management:  1. S/p fall:   - VSS   - NST RR; TOCO regular contractions   - Abdominal exam: no bruising noted on exam   - SVE: 0.5/50/-3 >>1.5/50/-3> 2/50/-3   - Will admit 2/2 cervical dilation in the setting of contractions s/p fall without abdominal trauma   - Presentation: vertex   - Discussed with OB ED staff, patient stable to transfer for  delivery.   Other:   I discussed test(s) with the performing physician.            Attending Attestation:   Physician Attestation Statement for Resident:  As the supervising MD   Physician Attestation Statement: I have personally seen and examined this patient.   I agree with the above history. -:   As the supervising MD I agree with the above PE.    As the supervising MD I agree with the above treatment, course, plan, and disposition.  I was personally present during the critical portions of the procedure(s) performed by the resident and was immediately available in the ED to provide services and assistance as needed during the entire procedure.  I have reviewed and agree with the residents interpretation of the following: lab data.  I have reviewed  the following: old records at this facility.                         Clinical Impression:   Final diagnoses:  [O80, Z37.9] Normal labor (Primary)  [Z3A.38] 38 weeks gestation of pregnancy  [O47.9] Uterine contractions during pregnancy          ED Disposition Condition    Admit               Jocelyn Hernandez MD  Resident  01/14/22 1709       May Ball MD  01/14/22 0282

## 2022-01-14 NOTE — ANESTHESIA PREPROCEDURE EVALUATION
[facility]  Anesthesia Pre-Operative Evaluation         Patient Name: Chayo Williamson  YOB: 1985  MRN: [id number]    SUBJECTIVE:     Pre-operative evaluation for Procedure(s) (LRB):   SECTION (N/A)     2022    Chayo Williamson is a  36 y.o. female w/ a significant PMHx of AMA, anemia and migraines who presents in labor at 38w4d after experiencing a fall onto her side. Last oral intake at 09:00 today.     Patient now presents for the above procedure(s).    OB History    Para Term  AB Living   6 2 2 0 1 2   SAB IAB Ectopic Multiple Live Births   1              # Outcome Date GA Lbr Antoine/2nd Weight Sex Delivery Anes PTL Lv   6 Current            5 Term            4 Term            3 SAB            2       CS-LTranv      1       CS-LTranv            LDA:        Peripheral IV - Single Lumen 22 1227 18 G Anterior;Left Forearm (Active)   Number of days: 0       Prev airway: None documented.    Drips: None documented.      Patient Active Problem List   Diagnosis    Family history of breast cancer    Seborrheic dermatitis of scalp    Migraine without aura and without status migrainosus, not intractable    Acquired hypothyroidism    History of 2  sections       Review of patient's allergies indicates:  No Known Allergies    Current Inpatient Medications:   lactated ringers  1,000 mL Intravenous Once       No current facility-administered medications on file prior to encounter.     Current Outpatient Medications on File Prior to Encounter   Medication Sig Dispense Refill    ferrous sulfate (FEOSOL) Tab tablet Take 1 tablet by mouth daily with breakfast.      PRENATAL 105-IRON-FOLIC AC-DHA ORAL Take by mouth.         Past Surgical History:   Procedure Laterality Date     SECTION       X 2       Social History     Socioeconomic History    Marital status:     Number of children: 2    Occupational History    Occupation:      Comment: city year [place]   Tobacco Use    Smoking status: Never Smoker    Smokeless tobacco: Never Used   Substance and Sexual Activity    Alcohol use: Not Currently     Comment: social    Drug use: No    Sexual activity: Yes     Partners: Male     Birth control/protection: None   Social History Narrative    2 children: 1 son and 1 daughter       OBJECTIVE:     Vital Signs Range (Last 24H):  Temp:  [36.7 °C (98.1 °F)]   Pulse:  []   Resp:  [18]   BP: (123-124)/(58-68)   SpO2:  [98 %-100 %]       Significant Labs:  Lab Results   Component Value Date    WBC 7.26 01/14/2022    HGB 9.4 (L) 01/14/2022    HCT 30.0 (L) 01/14/2022     01/14/2022    ALT 15 08/25/2016    AST 18 08/25/2016     08/25/2016    K 3.7 08/25/2016     08/25/2016    CREATININE 0.7 08/25/2016    BUN 9 08/25/2016    CO2 25 08/25/2016    TSH 1.006 05/23/2018       Diagnostic Studies: No relevant studies.    EKG:   No results found for this or any previous visit.    2D ECHO:  TTE:  No results found for this or any previous visit.    SILVER:  No results found for this or any previous visit.    ASSESSMENT/PLAN:                                                                                                                 01/14/2022  Chayo Williamson is a 36 y.o., female.    Anesthesia Evaluation    I have reviewed the Patient Summary Reports.   I have reviewed the NPO Status.   I have reviewed the Medications.     Review of Systems  Anesthesia Hx:  Denies Family Hx of Anesthesia complications.    Social:  Non-Smoker, No Alcohol Use    Hematology/Oncology:         -- Anemia:   EENT/Dental:EENT/Dental Normal   Pulmonary:  Pulmonary Normal    Neurological:   Headaches (migraines)    Endocrine:   Hypothyroidism        Physical Exam  General:  Well nourished    Airway/Jaw/Neck:  Airway Findings: Mouth Opening: Normal Tongue: Normal  General Airway Assessment:  Adult  Mallampati: IV  TM Distance: Normal, at least 6 cm     Eyes/Ears/Nose:  EYES/EARS/NOSE FINDINGS: Normal   Dental:  Dental Findings: In tact        Mental Status:  Mental Status Findings:  Cooperative, Alert and Oriented         Anesthesia Plan  Type of Anesthesia, risks & benefits discussed:  Anesthesia Type:  CSE, spinal    Patient's Preference:   Plan Factors:          Intra-op Monitoring Plan: standard ASA monitors  Intra-op Monitoring Plan Comments:   Post Op Pain Control Plan: multimodal analgesia, IV/PO Opioids PRN, per primary service following discharge from PACU and intrathecal opioid  Post Op Pain Control Plan Comments:     Induction:    Beta Blocker:  Patient is not currently on a Beta-Blocker (No further documentation required).       Informed Consent: Patient understands risks and agrees with Anesthesia plan.  Questions answered. Anesthesia consent signed with patient.  ASA Score: 3     Day of Surgery Review of History & Physical:    H&P update referred to the surgeon.         Ready For Surgery From Anesthesia Perspective.

## 2022-01-15 PROBLEM — Z37.9 NORMAL LABOR: Status: RESOLVED | Noted: 2022-01-14 | Resolved: 2022-01-15

## 2022-01-15 PROBLEM — O09.523 MULTIGRAVIDA OF ADVANCED MATERNAL AGE IN THIRD TRIMESTER: Status: RESOLVED | Noted: 2022-01-14 | Resolved: 2022-01-15

## 2022-01-15 PROBLEM — Z98.891 STATUS POST REPEAT LOW TRANSVERSE CESAREAN SECTION: Status: ACTIVE | Noted: 2022-01-15

## 2022-01-15 LAB
BASOPHILS # BLD AUTO: 0.02 K/UL (ref 0–0.2)
BASOPHILS NFR BLD: 0.2 % (ref 0–1.9)
DIFFERENTIAL METHOD: ABNORMAL
EOSINOPHIL # BLD AUTO: 0 K/UL (ref 0–0.5)
EOSINOPHIL NFR BLD: 0 % (ref 0–8)
ERYTHROCYTE [DISTWIDTH] IN BLOOD BY AUTOMATED COUNT: 15.4 % (ref 11.5–14.5)
HCT VFR BLD AUTO: 27.1 % (ref 37–48.5)
HGB BLD-MCNC: 8.5 G/DL (ref 12–16)
IMM GRANULOCYTES # BLD AUTO: 0.05 K/UL (ref 0–0.04)
IMM GRANULOCYTES NFR BLD AUTO: 0.4 % (ref 0–0.5)
LYMPHOCYTES # BLD AUTO: 0.8 K/UL (ref 1–4.8)
LYMPHOCYTES NFR BLD: 6.6 % (ref 18–48)
MCH RBC QN AUTO: 23.7 PG (ref 27–31)
MCHC RBC AUTO-ENTMCNC: 31.4 G/DL (ref 32–36)
MCV RBC AUTO: 76 FL (ref 82–98)
MONOCYTES # BLD AUTO: 0.8 K/UL (ref 0.3–1)
MONOCYTES NFR BLD: 6.6 % (ref 4–15)
NEUTROPHILS # BLD AUTO: 10.4 K/UL (ref 1.8–7.7)
NEUTROPHILS NFR BLD: 86.2 % (ref 38–73)
NRBC BLD-RTO: 0 /100 WBC
PLATELET # BLD AUTO: 177 K/UL (ref 150–450)
PMV BLD AUTO: 13 FL (ref 9.2–12.9)
RBC # BLD AUTO: 3.58 M/UL (ref 4–5.4)
WBC # BLD AUTO: 12.01 K/UL (ref 3.9–12.7)

## 2022-01-15 PROCEDURE — 25000003 PHARM REV CODE 250: Performed by: STUDENT IN AN ORGANIZED HEALTH CARE EDUCATION/TRAINING PROGRAM

## 2022-01-15 PROCEDURE — 99024 PR POST-OP FOLLOW-UP VISIT: ICD-10-PCS | Mod: ,,, | Performed by: OBSTETRICS & GYNECOLOGY

## 2022-01-15 PROCEDURE — 11000001 HC ACUTE MED/SURG PRIVATE ROOM

## 2022-01-15 PROCEDURE — 99024 POSTOP FOLLOW-UP VISIT: CPT | Mod: ,,, | Performed by: OBSTETRICS & GYNECOLOGY

## 2022-01-15 PROCEDURE — 85025 COMPLETE CBC W/AUTO DIFF WBC: CPT

## 2022-01-15 PROCEDURE — 25000003 PHARM REV CODE 250

## 2022-01-15 PROCEDURE — 63600175 PHARM REV CODE 636 W HCPCS

## 2022-01-15 PROCEDURE — 36415 COLL VENOUS BLD VENIPUNCTURE: CPT

## 2022-01-15 RX ORDER — IBUPROFEN 600 MG/1
600 TABLET ORAL 3 TIMES DAILY
Qty: 30 TABLET | Refills: 1 | Status: SHIPPED | OUTPATIENT
Start: 2022-01-15 | End: 2022-06-30

## 2022-01-15 RX ORDER — HYDROCODONE BITARTRATE AND ACETAMINOPHEN 5; 325 MG/1; MG/1
1 TABLET ORAL EVERY 6 HOURS PRN
Qty: 20 TABLET | Refills: 0 | Status: SHIPPED | OUTPATIENT
Start: 2022-01-15 | End: 2022-06-30

## 2022-01-15 RX ADMIN — OXYCODONE 10 MG: 5 TABLET ORAL at 10:01

## 2022-01-15 RX ADMIN — ACETAMINOPHEN 650 MG: 325 TABLET, FILM COATED ORAL at 05:01

## 2022-01-15 RX ADMIN — DOCUSATE SODIUM 200 MG: 100 CAPSULE ORAL at 09:01

## 2022-01-15 RX ADMIN — ACETAMINOPHEN 650 MG: 325 TABLET, FILM COATED ORAL at 11:01

## 2022-01-15 RX ADMIN — KETOROLAC TROMETHAMINE 30 MG: 30 INJECTION, SOLUTION INTRAMUSCULAR at 12:01

## 2022-01-15 RX ADMIN — OXYCODONE 10 MG: 5 TABLET ORAL at 06:01

## 2022-01-15 RX ADMIN — DOCUSATE SODIUM 200 MG: 100 CAPSULE ORAL at 10:01

## 2022-01-15 RX ADMIN — OXYCODONE 10 MG: 5 TABLET ORAL at 12:01

## 2022-01-15 RX ADMIN — PRENATAL VIT W/ FE FUMARATE-FA TAB 27-0.8 MG 1 TABLET: 27-0.8 TAB at 09:01

## 2022-01-15 RX ADMIN — FERROUS SULFATE TAB 325 MG (65 MG ELEMENTAL FE) 1 EACH: 325 (65 FE) TAB at 09:01

## 2022-01-15 RX ADMIN — OXYCODONE 10 MG: 5 TABLET ORAL at 05:01

## 2022-01-15 RX ADMIN — ACETAMINOPHEN 650 MG: 325 TABLET, FILM COATED ORAL at 10:01

## 2022-01-15 RX ADMIN — IBUPROFEN 800 MG: 400 TABLET, FILM COATED ORAL at 05:01

## 2022-01-15 RX ADMIN — KETOROLAC TROMETHAMINE 30 MG: 30 INJECTION, SOLUTION INTRAMUSCULAR at 05:01

## 2022-01-15 NOTE — PROGRESS NOTES
POSTPARTUM PROGRESS NOTE    Subjective:     PPD/POD#: 1   Procedure: Repeat LTCS   EGA: 38w4d   N/V: No   F/C: No   Abd Pain: Mild, well-controlled with oral pain medication   Lochia: Mild   Voiding: Branham in place   Ambulating: Yes   Bowel fnc: Yes   Breastfeeding: Yes   Contraception: Per primary OB   Circumcision: Consented.  Needs to be examined by OB attending.     Objective:      Temp:  [98.1 °F (36.7 °C)] 98.1 °F (36.7 °C)  Pulse:  [] 74  Resp:  [18] 18  SpO2:  [98 %-100 %] 100 %  BP: (103-124)/(52-68) 103/57    Lung: Normal respiratory effort   Abdomen: Soft, appropriately tender   Uterus: Firm, no fundal tenderness   Incision: C/d/i with bandage in place   : Deferred   Extremities: Bilateral trace edema     Lab Review    No results for input(s): NA, K, CL, CO2, BUN, CREATININE, GLU, PROT, BILITOT, ALKPHOS, ALT, AST, MG, PHOS in the last 168 hours.    Recent Labs   Lab 22  1118   WBC 7.26   HGB 9.4*   HCT 30.0*   MCV 76*            I/O    Intake/Output Summary (Last 24 hours) at 2022 2340  Last data filed at 2022 2117  Gross per 24 hour   Intake 900 ml   Output 975 ml   Net -75 ml        Assessment and Plan:     1. Postpartum care:  - Patient doing well.  - Continue routine management and advances.  -D/c branham today. Await void    2. Anemia  - H/H as above  - QBL: 875  - asymptomatic  - iron/colace     PILI Burger MD  OBGYN PGY-3     I have personally seen and evaluated the patient. I have reviewed the residents note as documented above and agree with the documentation. POD 1 doing well. Branham still in place. Adequate UOP.  Intake/Output - Last 3 Shifts        0700   0659  0700  01/15 0659 01/15 0700   0659    P.O.  900     I.V. (mL/kg)  900 (12.2)     Total Intake(mL/kg)  1800 (24.4)     Urine (mL/kg/hr)  640 300 (0.7)    Blood  875     Total Output  1515 300    Net  +285 -300             TOlearting PO intake, pain well controlled. No flatus.   [place]  penis examined and cleared for circumcision.  D/c branham today.   Continue Post op advance.         Kingston Boothe  1/15/2022

## 2022-01-15 NOTE — LACTATION NOTE
Basic lactation education reviewed with breastfeeding guide. Baby not in room, getting circumcision. Mother states baby nursing well, has been feeding frequently this morning. Discussed sleepy baby after delivery then more active closer to 24hrs old. Mother states baby tugging/pull=ing, no nipple pain and hears swallows. Baby has wet/dirty diapers. Pt has Spectra pump for home use. Encouraged to feed on cue 8 or more times in 24hrs, hand express if too sleepy, STS. LC number on board, encouraged to call for assistance when baby returns.

## 2022-01-15 NOTE — TRANSFER OF CARE
"Anesthesia Transfer of Care Note    Patient: Chayo Williamson    Procedure(s) Performed: Procedure(s) (LRB):   SECTION (N/A)    Patient location: Labor and Delivery    Anesthesia Type: spinal    Transport from OR: Transported from OR on room air with adequate spontaneous ventilation    Post pain: adequate analgesia    Post assessment: no apparent anesthetic complications    Post vital signs: stable    Level of consciousness: awake    Nausea/Vomiting: no nausea/vomiting    Complications: none    Transfer of care protocol was followed      Last vitals:   Visit Vitals  BP (!) 113/55   Pulse 81   Temp 36.7 °C (98.1 °F) (Oral)   Resp 18   Ht 4' 11" (1.499 m)   Wt 73.9 kg (162 lb 14.7 oz)   LMP 2020   SpO2 99%   Breastfeeding No   BMI 32.91 kg/m²     "

## 2022-01-15 NOTE — L&D DELIVERY NOTE
[facility] - Labor & Delivery   Section   Operative Note    SUMMARY    Section Operative Note  Procedure Date: 2022    Procedure: Repeat  Section via Pfannenstiel skin incision with scar revision    Indications:  Labor with hx of 2 prior LTCS    Pre-operative Diagnosis:   IUP at 38 week 4 day pregnancy  Labor  Hx 2 prior LTCS  Anemia  AMA  Migraines    Post-operative Diagnosis:   S/p repeat LTCS  Same as above    Surgeon: Isabella Gautam MD     Assistants: Trever Burger MD PGY3  Susan Montgomery MD PGY2    Anesthesia: Spinal anesthesia    Findings: Normal appearing ovaries, fallopian tubes, uterus, and placenta. Dense fascial adhesions were present due to prior  sections. Male infant was vigorous at delivery with APGARS 8/9.    Estimated Blood Loss:   950cc           Total IV Fluids: See anesthesia report     UOP: See anesthesia report    Specimens: single viable male infant , Placenta which was discarded                  Complications:  None; patient tolerated the procedure well.           Disposition: PACU - hemodynamically stable.           Condition: stable    Procedure Details   The patient was seen in the Holding Room. The risks, benefits, complications, treatment options, and expected outcomes were discussed with the patient.  The patient concurred with the proposed plan, giving informed consent.  The site of surgery properly noted/marked. The patient was taken to Operating Room, identified as Chayo Williamson and the procedure verified as Repeat low transverse  Delivery. A Time Out was held and the above information confirmed.    After induction of anesthesia, the patient was prepped and draped in the usual sterile manner while placed in a dorsal supine position with a left lateral tilt.  A branham catheter was also placed per nursing. Preoperative antibiotics Ancef were administered and an allis test was performed yielding adequate anesthesia.  A  Pfannenstiel incision was made and the previous  scars were excised using an elliptical incision. The incision was then carried down through the subcutaneous tissue to the fascia. Fascial incision was made and extended transversely and dense adhesions from previous c-sections were present. The fascia was grasped with Kocher clamps and  from the underlying rectus tissue superiorly and inferiorly. Dense adhesions between the rectus and fascia were sharply dissected. Two allis clamps were used to grasp the rectus muscle at the midline. The linea alba was transected sharply using the scalpel. The peritoneum was identified, found to be free of adherent bowel and entered bluntly. Peritoneal incision was extended longitudinally. Peritoneal adhesions were transected using electrocautery. The vesico-uterine peritoneum was identified. The vesico-uterine peritoneum was incised transversely and the bladder flap was bluntly freed from the lower uterine segment. The bladder blade was inserted to keep the bladder out of the operative field. A low transverse uterine incision was made with knife and extended with cephalocaudad finger traction. The amniotic sac was ruptured bluntly and the infant was noted to be in cephalic position. The head was brought to the incision and elevated out of the pelvis. The patient delivered a single viable male infant without difficulty.  After the umbilical cord was clamped and cut cord blood was obtained for evaluation. The placenta was removed intact and appeared normal and was discarded. The uterus was exteriorized. The uterine outline, tubes and ovaries appeared normal. The uterine incision was closed with running locked sutures of 1-vicryl. Two figure of eight sutures were placed at the right angle of the hysterotomy for hemostasis. Excellent hemostasis was observed. The uterus was returned to the abdominal cavity. Incision was reinspected and good hemostasis was noted. The  "abdominal cavity was mopped with a moist lap to remove clots. The fascia was then reapproximated with running sutures of 0-vicryl. The subcutaneous fat and skin was reapproximated with 2-plain gut and 4-0 Monocryl respectively.    Instrument, sponge, and needle counts were correct prior the abdominal closure and at the conclusion of the case.     Patient tolerated procedure well.     Susan Montgomery MD PGY-2  Obstetrics and Gynecology              Delivery Information for Oliverio Williamson    Birth information:  YOB: 2024   Time of birth: 6:05 PM   Sex: male   Head Delivery Date/Time: 2022  6:04 PM   Delivery type: , Low Transverse   Gestational Age: 38w4d    Delivery Providers    Delivering clinician: Isabella Gautam MD   Provider Role    MD Trever Pool MD Alison F Webre, RN     Monique Diaz, RN     Tuyet Cristina, RN     Ivy Castillo,                Measurements    Weight: 3600 g  Weight (lbs): 7 lb 15 oz  Length: 53.3 cm  Length (in): 21"  Head circumference: 34.3 cm  Chest circumference: 30.5 cm         Apgars    Living status: Living  Apgars:  1 min.:  5 min.:  10 min.:  15 min.:  20 min.:    Skin color:  0  1       Heart rate:  2  2       Reflex irritability:  2  2       Muscle tone:  2  2       Respiratory effort:  2  2       Total:  8  9       Apgars assigned by: MICHAEL DIAZ         Operative Delivery    Forceps attempted?: No  Vacuum extractor attempted?: No         Shoulder Dystocia    Shoulder dystocia present?: No           Presentation    Presentation: Vertex           Interventions/Resuscitation    Method: Bulb Suctioning, Tactile Stimulation       Cord    Vessels: 3 vessels  Complications: None  Delayed Cord Clamping?: Yes  Cord Clamped Date/Time: 2022  6:05 PM  Cord Blood Disposition: Sent with Baby  Gases Sent?: No  Stem Cell Collection (by MD): No       Placenta    Placenta delivery date/time: 2022 1807  Placenta " removal: Manual removal  Placenta appearance: Intact  Placenta disposition: discarded           Labor Events:       labor:       Labor Onset Date/Time:         Dilation Complete Date/Time:         Start Pushing Date/Time:         Start Pushing Date/Time:       Rupture Date/Time:            Rupture type:          Fluid Amount:       Fluid Color:       Fluid Odor:       Membrane Status:                steroids:       Antibiotics given for GBS:       Induction:       Indications for induction:        Augmentation:       Indications for augmentation:       Labor complications: None     Additional complications:          Cervical ripening:                     Delivery:      Episiotomy: None     Indication for Episiotomy:       Perineal Lacerations: None Repaired:      Periurethral Laceration:   Repaired:     Labial Laceration:   Repaired:     Sulcus Laceration:   Repaired:     Vaginal Laceration:   Repaired:     Cervical Laceration:   Repaired:     Repair suture:       Repair # of packets:       Last Value - EBL - Nursing (mL):       Sum - EBL - Nursing (mL): 0     Last Value - EBL - Anesthesia (mL):      Calculated QBL (mL): 1275      Vaginal Sweep Performed: No     Surgicount Correct: Yes       Other providers:            Details (if applicable):  Trial of Labor No    Categorization: Repeat    Priority: Routine   Indications for :     Incision Type: low transverse     Additional  information:  Forceps:    Vacuum:    Breech:    Observed anomalies    Other (Comments):             I was present and scrubbed for the entire procedure until closure of the skin. I was immediately available if needed.     Isabella Gautam MD  Obstetrics and Gynecology  [facility]

## 2022-01-15 NOTE — PLAN OF CARE
Assumed care of patient s/p C/S. Fundus firm with moderate amount of lochia noted. Marquez catheter draining porter urine. SCD's applied. Siderails up X2. Patient with non skid socks in place. Breastfeeding infant without difficulty.  at bedside participating in care. Given instructions on pain medications. Will continue to monitor.

## 2022-01-15 NOTE — ANESTHESIA PROCEDURE NOTES
Spinal    Diagnosis: IUP  Patient location during procedure: OR  Start time: 1/14/2022 5:25 PM  Timeout: 1/14/2022 5:23 PM  End time: 1/14/2022 5:30 PM    Staffing  Authorizing Provider: Lucia Wilcox MD  Performing Provider: Gila Sheppard MD    Preanesthetic Checklist  Completed: patient identified, IV checked, site marked, risks and benefits discussed, surgical consent, monitors and equipment checked, pre-op evaluation and timeout performed  Spinal Block  Patient position: sitting  Prep: ChloraPrep  Patient monitoring: heart rate, continuous pulse ox and frequent blood pressure checks  Approach: midline  Location: L3-4  Injection technique: single shot  CSF Fluid: clear free-flowing CSF  Needle  Needle type: pencil-tip   Needle gauge: 25 G  Needle length: 5 in  Additional Documentation: incremental injection, negative aspiration for heme and no paresthesia on injection  Needle localization: anatomical landmarks  Assessment  Sensory level: T4   Dermatomal levels determined by pinch or prick  Ease of block: easy  Patient's tolerance of the procedure: comfortable throughout block and no complaints

## 2022-01-15 NOTE — DISCHARGE INSTRUCTIONS
Discharge Instructions    The AAP recommends exclusive breastfeeding for about the first 6 months of age and as solid foods are introduced, continued breastfeeding  for at least 1 year or longer.     Feed the baby at the earliest sign of hunger or comfort (see signs on the back cover of the Mother's Breastfeeding Guide)  o Hands to mouth, sucking motions  o Rooting or searching for something to suck on  o Dont wait for crying - it is a sign of distress     The feedings may be 8-12 times per 24hrs and will not follow a schedule   Avoid pacifiers and bottles for the first 4 weeks   Alternate the breast you start the feeding with, or start with the breast that feels the fullest   Switch breasts when the baby takes himself off the breast or falls asleep   Keep offering breasts until the baby looks full, no longer gives hunger signs, and stays asleep when placed on his back in the crib   If the baby is sleepy and wont wake for a feeding, put the baby skin-to-skin dressed in a diaper against the mothers bare chest   Sleep with your baby in your room near you   The baby should be positioned and latched on to the breast correctly  o Chest-to-chest, chin in the breast  o Babys lips are flipped outward  o Babys mouth is stretched open wide like a shout  o Babys sucking should feel like tugging to the mother  - The baby should be drinking at the breast:  o You should hear swallowing or gulping throughout the feeding  o You should see milk on the babys lips when he comes off the breast  o Your breasts should be softer when the baby is finished feeding  o The baby should look relaxed at the end of feedings  o After the 4th day and your milk is in:  o The babys poop should turn bright yellow and be loose, watery, and seedy  o The baby should have at least 3-4 poops the size of the palm of your hand per day  o The baby should have at least 5-6 wet diapers per day  o The urine should be light yellow in  color  You should drink when you are thirsty and eat a healthy diet when you are    hungry.     Take naps to get the rest you need.   Take medications and/or drink alcohol only with permission of your obstetrician    or the babys pediatrician.  You can also call the Infant Risk Center,   ([phone]), Monday-Friday, 8am-5pm Central time, to get the most   up-to-date evidence-based information on the use of medications during   pregnancy and breastfeeding.      Complete the First Alert form in the Mother's Breastfeeding Guide 3-5 days after the baby's birth.  Please call the Breastfeeding Warmline ([phone]) or the baby's pediatrician if you have any concerns.    The baby should be examined by a pediatrician at 3-5 days of age and again at 2 weeks of age.  Once your milk production increases, the baby should be gaining at least ½ - 1oz each day and should be back to birthweight no later than 10-14 days of age.  If this is not the case, please call the Breastfeeding Warmline (107-705-8017) for assistance and support.    Community Resources    [facility] Breastfeeding Warmline: [phone]   Local [facility] clinics: provide incentives and breastpumps to eligible mothers 6-127-797-BABY  La Leche League International (LLLI):  mother-to-mother support group website        www.lll.org  Local La Leche League mother-to-mother support groups:        www.lllZ Plane.[employer]ital Energi        La Leche League [facility]   Dr. Linwood Marie website for latch videos and general information:        www.breastfeedinginc.ca  Infant Risk Center is a call center that provides information about the safety of taking medications while breastfeeding.  Call 5-842-071-1761, M-F, 8am-5pm, [place].  International Lactation Consultant Association provides resources for assistance:        www.ilca.org  [place] Breastfeeding Coalition provides informationand resources for parents  and the community    www.LaBreastfeedingSupport.org  Zulay Urena  is a mom-to-mom support group:                             www.nolanesting.[employer]//breastfeedng-support/  Partners for Healthy Babies:  1-277-646-BABY(4539)  C[employer]ait: a breastfeeding support group for women of color, 577.394.3893

## 2022-01-15 NOTE — ANESTHESIA POSTPROCEDURE EVALUATION
Anesthesia Post Evaluation    Patient: Chayo Williamson    Procedure(s) Performed: Procedure(s) (LRB):   SECTION (N/A)    Final Anesthesia Type: spinal      Patient location during evaluation: floor  Patient participation: Yes- Able to Participate  Level of consciousness: awake and alert and oriented  Post-procedure vital signs: reviewed and stable  Pain management: adequate  Airway patency: patent    PONV status at discharge: No PONV  Anesthetic complications: no      Cardiovascular status: blood pressure returned to baseline and hemodynamically stable  Respiratory status: unassisted, spontaneous ventilation and room air  Hydration status: euvolemic  Follow-up not needed.          Vitals Value Taken Time   BP 98/51 01/15/22 0808   Temp 36.6 °C (97.8 °F) 01/15/22 0808   Pulse 70 01/15/22 0808   Resp 18 01/15/22 0808   SpO2 98 % 01/15/22 0808         No case tracking events are documented in the log.      Pain/Jeffrey Score: Pain Rating Prior to Med Admin: 5 (1/15/2022 12:15 PM)  Pain Rating Post Med Admin: 2 (1/15/2022  6:45 AM)

## 2022-01-16 VITALS
TEMPERATURE: 98 F | RESPIRATION RATE: 20 BRPM | DIASTOLIC BLOOD PRESSURE: 58 MMHG | HEIGHT: 59 IN | BODY MASS INDEX: 32.85 KG/M2 | SYSTOLIC BLOOD PRESSURE: 109 MMHG | WEIGHT: 162.94 LBS | HEART RATE: 76 BPM | OXYGEN SATURATION: 97 %

## 2022-01-16 PROCEDURE — 25000003 PHARM REV CODE 250: Performed by: STUDENT IN AN ORGANIZED HEALTH CARE EDUCATION/TRAINING PROGRAM

## 2022-01-16 PROCEDURE — 25000003 PHARM REV CODE 250

## 2022-01-16 RX ADMIN — IBUPROFEN 800 MG: 400 TABLET, FILM COATED ORAL at 10:01

## 2022-01-16 RX ADMIN — DOCUSATE SODIUM 200 MG: 100 CAPSULE ORAL at 08:01

## 2022-01-16 RX ADMIN — OXYCODONE 10 MG: 5 TABLET ORAL at 04:01

## 2022-01-16 RX ADMIN — OXYCODONE 10 MG: 5 TABLET ORAL at 12:01

## 2022-01-16 RX ADMIN — ACETAMINOPHEN 650 MG: 325 TABLET, FILM COATED ORAL at 12:01

## 2022-01-16 RX ADMIN — OXYCODONE 10 MG: 5 TABLET ORAL at 08:01

## 2022-01-16 RX ADMIN — PRENATAL VIT W/ FE FUMARATE-FA TAB 27-0.8 MG 1 TABLET: 27-0.8 TAB at 08:01

## 2022-01-16 RX ADMIN — ACETAMINOPHEN 650 MG: 325 TABLET, FILM COATED ORAL at 06:01

## 2022-01-16 RX ADMIN — IBUPROFEN 800 MG: 400 TABLET, FILM COATED ORAL at 01:01

## 2022-01-16 RX ADMIN — FERROUS SULFATE TAB 325 MG (65 MG ELEMENTAL FE) 1 EACH: 325 (65 FE) TAB at 08:01

## 2022-01-16 NOTE — NURSING
Pt c/o of dizziness; lightheadedness. HR 76; R 20; T 97.8; B/P 96/52. MD notified.500cc bolus ordered. Will continue to monitor.

## 2022-01-16 NOTE — DISCHARGE SUMMARY
Delivery Discharge Summary  Obstetrics      Primary OB Clinician: Maria Esther Chao MD      Admission date: 2022  Discharge date: 2022    Disposition: To home, self care    Discharge Diagnosis List:      Patient Active Problem List   Diagnosis    Family history of breast cancer    Seborrheic dermatitis of scalp    Migraine without aura and without status migrainosus, not intractable    Acquired hypothyroidism    History of 2  sections    Status post repeat low transverse  section       Procedure:  [facility]    Hospital Course:  Chayo Williamson is a 36 y.o. now , POD #2 who was admitted on 2022 at 38w6d for labor. Patient was subsequently admitted to labor and delivery unit with signed consents.     Due to history of CS x2 decision was made to proceed with delivery via  which was performed without complications.    Please see delivery note for further details. Her postpartum course was uncomplicated. On discharge day, patient's pain is controlled with oral pain medications. Pt is tolerating ambulation without SOB or CP, and regular diet without N/V. Reports lochia is mild. Denies any HA, vision changes, F/C, LE swelling. Denies any breast pain/soreness.    Pt in stable condition and ready for discharge. She has been instructed to start and/or continue medications and follow up with her obstetrics provider as listed below.    Pertinent studies:  CBC  Recent Labs   Lab 22  1118 01/15/22  0419   WBC 7.26 12.01   HGB 9.4* 8.5*   HCT 30.0* 27.1*   MCV 76* 76*    177          Immunization History   Administered Date(s) Administered    COVID-19, MRNA, LN-S, PF (Pfizer) 2021, 2021    Influenza - Quadrivalent - PF *Preferred* (6 months and older) 2017    Tdap 2019, 2021        Delivery:    Episiotomy: None   Lacerations: None   Repair suture:     Repair # of packets:     Blood loss (ml):       Birth information:  Date of birth:  2024   Time of birth: 6:05 PM   Sex: male   Delivery type: , Low Transverse   Gestational Age: 38w4d    Delivery Clinician:      Other providers:       Additional  information:  Forceps:    Vacuum:    Breech:    Observed anomalies      Living?:           APGARS  One minute Five minutes Ten minutes   Skin color:         Heart rate:         Grimace:         Muscle tone:         Breathing:         Totals: 8  9        Placenta: Delivered:       appearance      Patient Instructions:   Current Discharge Medication List        START taking these medications    Details   HYDROcodone-acetaminophen (NORCO) 5-325 mg per tablet Take 1 tablet by mouth every 6 (six) hours as needed for Pain.  Qty: 20 tablet, Refills: 0    Comments: Quantity prescribed more than 7 day supply? No      ibuprofen (ADVIL,MOTRIN) 600 MG tablet Take 1 tablet (600 mg total) by mouth 3 (three) times daily.  Qty: 30 tablet, Refills: 1           CONTINUE these medications which have NOT CHANGED    Details   ferrous sulfate (FEOSOL) Tab tablet Take 1 tablet by mouth daily with breakfast.      PRENATAL 105-IRON-FOLIC AC-DHA ORAL Take by mouth.             Discharge Procedure Orders   Diet Adult Regular     Diet Adult Regular     Other restrictions (specify):   Order Comments: No heavy lifting over 5 lbs  No baths until 6 week check up. Showers only.   No driving until off narcotic pain medication/not feeling drowsy or dizzy when driving.     Pelvic Rest     Notify your health care provider if you experience any of the following:  temperature >100.4     Notify your health care provider if you experience any of the following:  persistent nausea and vomiting or diarrhea     Notify your health care provider if you experience any of the following:  severe uncontrolled pain     Notify your health care provider if you experience any of the following:  redness, tenderness, or signs of infection (pain, swelling, redness, odor or green/yellow discharge  around incision site)     Notify your health care provider if you experience any of the following:  difficulty breathing or increased cough     Notify your health care provider if you experience any of the following:  severe persistent headache     Notify your health care provider if you experience any of the following:  worsening rash     Notify your health care provider if you experience any of the following:  persistent dizziness, light-headedness, or visual disturbances     Notify your health care provider if you experience any of the following:  increased confusion or weakness     Notify your health care provider if you experience any of the following:   Order Comments: Call if you are saturating more than 2 pads an hour for more than 2 hours in a row.     Pelvic Rest   Order Comments: Pelvic rest until follow up visit. Nothing in vagina -no sex, tampons, douching, etc.     Notify your health care provider if you experience any of the following:   Order Comments: Heavy vaginal bleeding saturating more than 1 pad per hr for at least consecutive 2 hrs.     Notify your health care provider if you experience any of the following:  increased confusion or weakness     Notify your health care provider if you experience any of the following:  persistent dizziness, light-headedness, or visual disturbances     Notify your health care provider if you experience any of the following:  severe persistent headache     Notify your health care provider if you experience any of the following:  difficulty breathing or increased cough     Notify your health care provider if you experience any of the following:  redness, tenderness, or signs of infection (pain, swelling, redness, odor or green/yellow discharge around incision site)     Notify your health care provider if you experience any of the following:  severe uncontrolled pain     Notify your health care provider if you experience any of the following:  persistent nausea and  vomiting or diarrhea     Notify your health care provider if you experience any of the following:  temperature >100.4        Follow-up Information       Maria Esther Chao MD In 6 weeks.    Specialties: Obstetrics, Obstetrics and Gynecology  Why: postpartum visit  Contact information:  [address] [address]  [phone]                              Shilpa Beck MD  OBGYN, PGY-1    I have reviewed and concur with the resident's history, physical assessment and plan.  I have personally interviewed and examined the patient at bedside.    Isabella Gautam MD  Obstetrics and Gynecology  [facility]

## 2022-01-16 NOTE — LACTATION NOTE
01/16/22 1015   Maternal Assessment   Breast Shape Bilateral:;round   Breast Density Bilateral:;soft   Areola Bilateral:;elastic   Nipples Bilateral:;everted   Left Nipple Symptoms tender   Right Nipple Symptoms tender   Maternal Infant Feeding   Maternal Emotional State assist needed;relaxed   Infant Positioning cross-cradle   Signs of Milk Transfer audible swallow;infant jaw motion present   Latch Assistance yes   Lactation Referrals   Lactation Referrals outpatient lactation program  (community resouces)   Lactation rounds: pt breastfeeding baby in cradle position. Baby sleepy at breast but becomes active with stimulation. Pt with nipple tenderness with breastfeeding.  Assisted pt with breastfeeding baby in cross cradle position on left breast. Baby latched easily to breast. Tugs and pulls observed. Breast compression and stimulation needed to keep baby actively breastfeed. Latch more comfortable per pt. Discharge breastfeeding education provided. Questions answered. Pt has lactation warmline number and community resources.

## 2022-01-16 NOTE — PROGRESS NOTES
POSTPARTUM PROGRESS NOTE    Subjective:     PPD/POD#: 2   Procedure: Repeat LTCS   EGA: 38w4d   N/V: No   F/C: No   Abd Pain: Mild, well-controlled with oral pain medication   Lochia: Mild   Voiding: Yes   Ambulating: Yes   Bowel fnc: Yes   Breastfeeding: Yes   Contraception: Per primary OB   Circumcision: Completed     Objective:      Temp:  [97.7 °F (36.5 °C)-98.9 °F (37.2 °C)] 98.2 °F (36.8 °C)  Pulse:  [70-82] 78  Resp:  [16-18] 16  SpO2:  [97 %-98 %] 97 %  BP: ()/(50-58) 101/55    Lung: Normal respiratory effort   Abdomen: Soft, appropriately tender   Uterus: Firm, no fundal tenderness   Incision: C/d/i with bandage in place   : Deferred   Extremities: Bilateral trace edema     Lab Review    No results for input(s): NA, K, CL, CO2, BUN, CREATININE, GLU, PROT, BILITOT, ALKPHOS, ALT, AST, MG, PHOS in the last 168 hours.    Recent Labs   Lab 01/14/22  1118 01/15/22  0419   WBC 7.26 12.01   HGB 9.4* 8.5*   HCT 30.0* 27.1*   MCV 76* 76*    177         I/O    Intake/Output Summary (Last 24 hours) at 1/16/2022 0414  Last data filed at 1/15/2022 1600  Gross per 24 hour   Intake 300 ml   Output 1050 ml   Net -750 ml        Assessment and Plan:     1. Postpartum care:  - Patient doing well.  - Continue routine management and advances.  - Voiding spontaneously    2. Anemia  - H/H as above  - QBL: 875  - asymptomatic  - iron/colace     Snow Grubbs M.D.  OB/GYN PGY-3    I have reviewed and concur with the resident's history, physical assessment and plan.  Sleeping on evaluation and I will return to see her later today.     Isabella Gautam MD  Obstetrics and Gynecology  [facility]

## 2022-01-16 NOTE — PLAN OF CARE
VSS. Patient voiding and  ambulating independently.  Fundus firm with moderate amount of lochia noted.  LT incision clean and dry, abdominal binder applied. Patient breastfeeding on demand. Safety environment maintained. Side rails up X2 patient with non skid slippers.  at bedside participating in care. Pain controlled with PRN meds. Will continue to monitor.

## 2022-01-16 NOTE — PLAN OF CARE
Pt discharged per OB order. Pt ambulating, voiding spontaneously without difficulty, passing flatus and pain controlled with oral medications. Fundus firm, midline; lochia light.  Pt verbalized understanding to follow up for post partum visit in 6 weeks. Discharge education reviewed. No further questions. IV removed. VSS.

## 2022-01-16 NOTE — PLAN OF CARE
Patient safety maintained, side rails up, bed low and locked position. Pt ambulating and voiding independently.  Dressing dry and intact. Pain medication given. Fundus midline, firm, with moderate  lochia. Patient responding to infant cues. Significant other at bedside and assisting in patient's care. Will report to night shift.

## 2022-01-18 ENCOUNTER — PATIENT MESSAGE (OUTPATIENT)
Dept: ADMINISTRATIVE | Facility: HOSPITAL | Age: 37
End: 2022-01-18
Payer: COMMERCIAL

## 2022-01-19 ENCOUNTER — PATIENT MESSAGE (OUTPATIENT)
Dept: OBSTETRICS AND GYNECOLOGY | Facility: CLINIC | Age: 37
End: 2022-01-19

## 2022-01-19 ENCOUNTER — HOSPITAL ENCOUNTER (EMERGENCY)
Facility: OTHER | Age: 37
Discharge: HOME | End: 2022-01-19
Attending: OBSTETRICS & GYNECOLOGY
Payer: COMMERCIAL

## 2022-01-19 ENCOUNTER — ANESTHESIA (OUTPATIENT)
Dept: OBSTETRICS AND GYNECOLOGY | Facility: OTHER | Age: 37
End: 2022-01-19
Payer: COMMERCIAL

## 2022-01-19 ENCOUNTER — POSTPARTUM VISIT (OUTPATIENT)
Dept: OBSTETRICS AND GYNECOLOGY | Facility: CLINIC | Age: 37
End: 2022-01-19
Attending: OBSTETRICS & GYNECOLOGY
Payer: COMMERCIAL

## 2022-01-19 VITALS
WEIGHT: 153 LBS | SYSTOLIC BLOOD PRESSURE: 130 MMHG | HEIGHT: 59 IN | BODY MASS INDEX: 30.84 KG/M2 | DIASTOLIC BLOOD PRESSURE: 78 MMHG

## 2022-01-19 VITALS
SYSTOLIC BLOOD PRESSURE: 122 MMHG | OXYGEN SATURATION: 95 % | TEMPERATURE: 99 F | DIASTOLIC BLOOD PRESSURE: 69 MMHG | HEART RATE: 78 BPM | RESPIRATION RATE: 18 BRPM

## 2022-01-19 DIAGNOSIS — R51.9 NONINTRACTABLE HEADACHE, UNSPECIFIED CHRONICITY PATTERN, UNSPECIFIED HEADACHE TYPE: Primary | ICD-10-CM

## 2022-01-19 LAB
ALBUMIN SERPL BCP-MCNC: 2.7 G/DL (ref 3.5–5.2)
ALP SERPL-CCNC: 87 U/L (ref 55–135)
ALT SERPL W/O P-5'-P-CCNC: 34 U/L (ref 10–44)
ANION GAP SERPL CALC-SCNC: 11 MMOL/L (ref 8–16)
AST SERPL-CCNC: 26 U/L (ref 10–40)
BASOPHILS # BLD AUTO: 0.03 K/UL (ref 0–0.2)
BASOPHILS NFR BLD: 0.5 % (ref 0–1.9)
BILIRUB SERPL-MCNC: 0.4 MG/DL (ref 0.1–1)
BUN SERPL-MCNC: 11 MG/DL (ref 6–20)
CALCIUM SERPL-MCNC: 8.2 MG/DL (ref 8.7–10.5)
CHLORIDE SERPL-SCNC: 106 MMOL/L (ref 95–110)
CO2 SERPL-SCNC: 23 MMOL/L (ref 23–29)
CREAT SERPL-MCNC: 0.6 MG/DL (ref 0.5–1.4)
DIFFERENTIAL METHOD: ABNORMAL
EOSINOPHIL # BLD AUTO: 0.2 K/UL (ref 0–0.5)
EOSINOPHIL NFR BLD: 3.2 % (ref 0–8)
ERYTHROCYTE [DISTWIDTH] IN BLOOD BY AUTOMATED COUNT: 15.5 % (ref 11.5–14.5)
EST. GFR  (AFRICAN AMERICAN): >60 ML/MIN/1.73 M^2
EST. GFR  (NON AFRICAN AMERICAN): >60 ML/MIN/1.73 M^2
GLUCOSE SERPL-MCNC: 88 MG/DL (ref 70–110)
HCT VFR BLD AUTO: 27.7 % (ref 37–48.5)
HGB BLD-MCNC: 8.5 G/DL (ref 12–16)
IMM GRANULOCYTES # BLD AUTO: 0.03 K/UL (ref 0–0.04)
IMM GRANULOCYTES NFR BLD AUTO: 0.5 % (ref 0–0.5)
LYMPHOCYTES # BLD AUTO: 1.1 K/UL (ref 1–4.8)
LYMPHOCYTES NFR BLD: 16.1 % (ref 18–48)
MCH RBC QN AUTO: 23.9 PG (ref 27–31)
MCHC RBC AUTO-ENTMCNC: 30.7 G/DL (ref 32–36)
MCV RBC AUTO: 78 FL (ref 82–98)
MONOCYTES # BLD AUTO: 0.4 K/UL (ref 0.3–1)
MONOCYTES NFR BLD: 6.5 % (ref 4–15)
NEUTROPHILS # BLD AUTO: 4.8 K/UL (ref 1.8–7.7)
NEUTROPHILS NFR BLD: 73.2 % (ref 38–73)
NRBC BLD-RTO: 0 /100 WBC
PLATELET # BLD AUTO: 231 K/UL (ref 150–450)
PMV BLD AUTO: 11.5 FL (ref 9.2–12.9)
POTASSIUM SERPL-SCNC: 3.9 MMOL/L (ref 3.5–5.1)
PROT SERPL-MCNC: 5.9 G/DL (ref 6–8.4)
RBC # BLD AUTO: 3.56 M/UL (ref 4–5.4)
SARS-COV-2 RDRP RESP QL NAA+PROBE: NEGATIVE
SODIUM SERPL-SCNC: 140 MMOL/L (ref 136–145)
WBC # BLD AUTO: 6.58 K/UL (ref 3.9–12.7)

## 2022-01-19 PROCEDURE — 1111F PR DISCHARGE MEDS RECONCILED W/ CURRENT OUTPATIENT MED LIST: ICD-10-PCS | Mod: CPTII,S$GLB,, | Performed by: OBSTETRICS & GYNECOLOGY

## 2022-01-19 PROCEDURE — 96361 HYDRATE IV INFUSION ADD-ON: CPT

## 2022-01-19 PROCEDURE — 1111F DSCHRG MED/CURRENT MED MERGE: CPT | Mod: CPTII,S$GLB,, | Performed by: OBSTETRICS & GYNECOLOGY

## 2022-01-19 PROCEDURE — 96374 THER/PROPH/DIAG INJ IV PUSH: CPT

## 2022-01-19 PROCEDURE — 80053 COMPREHEN METABOLIC PANEL: CPT

## 2022-01-19 PROCEDURE — 99213 OFFICE O/P EST LOW 20 MIN: CPT | Mod: S$GLB,,, | Performed by: OBSTETRICS & GYNECOLOGY

## 2022-01-19 PROCEDURE — 63600175 PHARM REV CODE 636 W HCPCS: Performed by: OBSTETRICS & GYNECOLOGY

## 2022-01-19 PROCEDURE — 63600175 PHARM REV CODE 636 W HCPCS

## 2022-01-19 PROCEDURE — 96375 TX/PRO/DX INJ NEW DRUG ADDON: CPT

## 2022-01-19 PROCEDURE — 25000003 PHARM REV CODE 250

## 2022-01-19 PROCEDURE — 99283 PR EMERGENCY DEPT VISIT,LEVEL III: ICD-10-PCS | Mod: ,,, | Performed by: OBSTETRICS & GYNECOLOGY

## 2022-01-19 PROCEDURE — 96372 THER/PROPH/DIAG INJ SC/IM: CPT | Mod: 59

## 2022-01-19 PROCEDURE — U0002 COVID-19 LAB TEST NON-CDC: HCPCS

## 2022-01-19 PROCEDURE — 99283 EMERGENCY DEPT VISIT LOW MDM: CPT | Mod: ,,, | Performed by: OBSTETRICS & GYNECOLOGY

## 2022-01-19 PROCEDURE — 99284 EMERGENCY DEPT VISIT MOD MDM: CPT | Mod: 25

## 2022-01-19 PROCEDURE — 99213 PR OFFICE/OUTPT VISIT, EST, LEVL III, 20-29 MIN: ICD-10-PCS | Mod: S$GLB,,, | Performed by: OBSTETRICS & GYNECOLOGY

## 2022-01-19 PROCEDURE — 85025 COMPLETE CBC W/AUTO DIFF WBC: CPT

## 2022-01-19 RX ORDER — BUTALBITAL, ACETAMINOPHEN AND CAFFEINE 50; 325; 40 MG/1; MG/1; MG/1
2 TABLET ORAL EVERY 4 HOURS PRN
Status: DISCONTINUED | OUTPATIENT
Start: 2022-01-19 | End: 2022-01-20 | Stop reason: HOSPADM

## 2022-01-19 RX ORDER — DIPHENHYDRAMINE HYDROCHLORIDE 50 MG/ML
25 INJECTION INTRAMUSCULAR; INTRAVENOUS ONCE
Status: COMPLETED | OUTPATIENT
Start: 2022-01-19 | End: 2022-01-19

## 2022-01-19 RX ORDER — PROCHLORPERAZINE MALEATE 5 MG
10 TABLET ORAL ONCE
Status: COMPLETED | OUTPATIENT
Start: 2022-01-19 | End: 2022-01-19

## 2022-01-19 RX ORDER — DIVALPROEX SODIUM 250 MG/1
500 TABLET, DELAYED RELEASE ORAL ONCE
Status: COMPLETED | OUTPATIENT
Start: 2022-01-19 | End: 2022-01-19

## 2022-01-19 RX ORDER — KETOROLAC TROMETHAMINE 30 MG/ML
30 INJECTION, SOLUTION INTRAMUSCULAR; INTRAVENOUS EVERY 6 HOURS PRN
Status: DISCONTINUED | OUTPATIENT
Start: 2022-01-19 | End: 2022-01-20 | Stop reason: HOSPADM

## 2022-01-19 RX ADMIN — DIVALPROEX SODIUM 500 MG: 250 TABLET, DELAYED RELEASE ORAL at 06:01

## 2022-01-19 RX ADMIN — LORAZEPAM 1 MG: 2 INJECTION INTRAMUSCULAR; INTRAVENOUS at 06:01

## 2022-01-19 RX ADMIN — KETOROLAC TROMETHAMINE 30 MG: 30 INJECTION, SOLUTION INTRAMUSCULAR; INTRAVENOUS at 06:01

## 2022-01-19 RX ADMIN — DIPHENHYDRAMINE HYDROCHLORIDE 25 MG: 50 INJECTION INTRAMUSCULAR; INTRAVENOUS at 06:01

## 2022-01-19 RX ADMIN — SODIUM CHLORIDE, SODIUM LACTATE, POTASSIUM CHLORIDE, AND CALCIUM CHLORIDE 1000 ML: .6; .31; .03; .02 INJECTION, SOLUTION INTRAVENOUS at 06:01

## 2022-01-19 RX ADMIN — BUTALBITAL, ACETAMINOPHEN AND CAFFEINE 2 TABLET: 50; 325; 40 TABLET ORAL at 04:01

## 2022-01-19 RX ADMIN — PROCHLORPERAZINE MALEATE 10 MG: 5 TABLET ORAL at 04:01

## 2022-01-19 NOTE — ED PROVIDER NOTES
Encounter Date: 2022       History     Chief Complaint   Patient presents with    postpartum pre-e labs     HPI   Chayo Williamson is a 36 y.o.  who presents at POD#5 for intractable headache. Reports headache has been present for 3-4 days and is not relieved with ibuprofen (last took ibuprofen at 1400). Reports worsening pain with sitting up/ standing up and movement. Denies other neurological symptoms: numbness, weakness, blurry vision. Reports worsening lower bilateral limb edema. Denies scotoma, shortness of breath, and RUQ pain.     This IUP is complicated by h/o  delivery x3 and AMA.     Review of patient's allergies indicates:  No Known Allergies  No past medical history on file.  Past Surgical History:   Procedure Laterality Date     SECTION       X 2     SECTION N/A 2022    Procedure:  SECTION;  Surgeon: Giselle Matthews MD;  [facility];  Service: OB/GYN;  Laterality: N/A;     Family History   Problem Relation Age of Onset    Breast cancer Mother 45    Hypertension Maternal Grandmother     Stroke Maternal Grandmother     Diabetes Maternal Grandmother     Breast cancer Other     Colon cancer Paternal Aunt     Ovarian cancer Neg Hx     Cancer Neg Hx      Social History     Tobacco Use    Smoking status: Never Smoker    Smokeless tobacco: Never Used   Substance Use Topics    Alcohol use: Not Currently     Comment: social    Drug use: No     Review of Systems   Constitutional: Negative for chills and fever.   Eyes: Negative for photophobia and visual disturbance.   Respiratory: Negative for cough and shortness of breath.    Cardiovascular: Negative for chest pain, palpitations and leg swelling.   Gastrointestinal: Negative for abdominal pain, nausea and vomiting.   Genitourinary: Negative for difficulty urinating, dysuria, flank pain, frequency, hematuria, pelvic pain, urgency, vaginal bleeding, vaginal discharge and vaginal  pain.   Neurological: Positive for headaches.       Physical Exam     Initial Vitals [01/19/22 1515]   BP Pulse Resp Temp SpO2   (!) 142/76 98 16 98.5 °F (36.9 °C) 100 %      MAP       --         Physical Exam    Vitals reviewed.  Constitutional: She appears well-developed and well-nourished.   HENT:   Head: Normocephalic.   Eyes: Conjunctivae are normal. Pupils are equal, round, and reactive to light.   Neck:   Normal range of motion.  Cardiovascular: Normal rate.   Pulmonary/Chest: Effort normal. No respiratory distress.   Abdominal: Abdomen is soft. There is no abdominal tenderness.   No right CVA tenderness.  No left CVA tenderness. There is no rebound and no guarding.   Musculoskeletal:         General: Normal range of motion.      Cervical back: Normal range of motion.     Neurological: She is alert and oriented to person, place, and time. She has normal strength. No sensory deficit.   Skin: Skin is warm and dry. Capillary refill takes less than 2 seconds.   Psychiatric: She has a normal mood and affect. Her behavior is normal. Judgment and thought content normal.         ED Course   Procedures  Labs Reviewed   CBC W/ AUTO DIFFERENTIAL - Abnormal; Notable for the following components:       Result Value    RBC 3.56 (*)     Hemoglobin 8.5 (*)     Hematocrit 27.7 (*)     MCV 78 (*)     MCH 23.9 (*)     MCHC 30.7 (*)     RDW 15.5 (*)     Gran % 73.2 (*)     Lymph % 16.1 (*)     All other components within normal limits   COMPREHENSIVE METABOLIC PANEL - Abnormal; Notable for the following components:    Calcium 8.2 (*)     Total Protein 5.9 (*)     Albumin 2.7 (*)     All other components within normal limits   SARS-COV-2 RNA AMPLIFICATION, QUAL          Imaging Results    None          Medications   prochlorperazine tablet 10 mg (10 mg Oral Given 1/19/22 1640)   lactated ringers bolus 1,000 mL (0 mLs Intravenous Stopped 1/19/22 1900)   diphenhydrAMINE injection 25 mg (25 mg Intravenous Given 1/19/22 1807)    lorazepam (ATIVAN) injection 1 mg (1 mg Intravenous Given 1/19/22 1805)   divalproex EC tablet 500 mg (500 mg Oral Given 1/19/22 1801)     Medical Decision Making:   ED Management:  1. Headache:   - VSS   - No neurological findings on neuro exam  - CBC: wnl  - CMP: wnl  - Covid: negative   - Anesthesia asked to evaluate for spinal headache due to positional changes. After evaluation, they do not think that she would benefit from a blood patch  - Headache was unrelieved with Fioricet 2 tablets and compazine 10 mg. Headache resolved following administration of headache cocktail      Return precautions given. Stable for discharge with strict precautions    Susan Montgomery MD PGY-2  Obstetrics and Gynecology    Other:   I have discussed this case with another health care provider.                      Clinical Impression:   Final diagnoses:  [R51.9] Nonintractable headache, unspecified chronicity pattern, unspecified headache type (Primary)          ED Disposition Condition    Discharge Stable        ED Prescriptions     None        Follow-up Information    None          Susan Montgomery MD  Resident  01/20/22 3830

## 2022-01-19 NOTE — PROGRESS NOTES
Chayo Williamson is a 36 y.o. female  presents for a postpartum visit.  She is status post C section  5 weeks ago.  Her hospitalization was not complicated.  She is breastfeeding.  She reports worsening headache since . She also reports that her swelling is worse. She denies blurry vision. She reports that she has taken Ibuprofen every 6 hours and took a Vicodin last night.     History reviewed. No pertinent past medical history.  Past Surgical History:   Procedure Laterality Date     SECTION       X 2     SECTION N/A 2022    Procedure:  SECTION;  Surgeon: Giselle Matthews MD;  Location: [facility] L&[facility];  Service: OB/GYN;  Laterality: N/A;     Review of patient's allergies indicates:  No Known Allergies    Current Outpatient Medications:     ferrous sulfate (FEOSOL) Tab tablet, Take 1 tablet by mouth daily with breakfast., Disp: , Rfl:     HYDROcodone-acetaminophen (NORCO) 5-325 mg per tablet, Take 1 tablet by mouth every 6 (six) hours as needed for Pain., Disp: 20 tablet, Rfl: 0    ibuprofen (ADVIL,MOTRIN) 600 MG tablet, Take 1 tablet (600 mg total) by mouth 3 (three) times daily., Disp: 30 tablet, Rfl: 1    PRENATAL 105-IRON-FOLIC AC-DHA ORAL, Take by mouth., Disp: , Rfl:       Vitals:    22 1319   BP: 130/78       GENERAL: healthy      Assessment:  Headache   Postpartum    Plan: To L&D for further workup

## 2022-01-24 ENCOUNTER — PATIENT MESSAGE (OUTPATIENT)
Dept: OBSTETRICS AND GYNECOLOGY | Facility: CLINIC | Age: 37
End: 2022-01-24
Payer: COMMERCIAL

## 2022-02-21 ENCOUNTER — POSTPARTUM VISIT (OUTPATIENT)
Dept: OBSTETRICS AND GYNECOLOGY | Facility: CLINIC | Age: 37
End: 2022-02-21
Attending: OBSTETRICS & GYNECOLOGY
Payer: COMMERCIAL

## 2022-02-21 VITALS
HEIGHT: 59 IN | WEIGHT: 137 LBS | DIASTOLIC BLOOD PRESSURE: 78 MMHG | SYSTOLIC BLOOD PRESSURE: 122 MMHG | BODY MASS INDEX: 27.62 KG/M2 | HEART RATE: 74 BPM

## 2022-02-21 PROCEDURE — 0503F POSTPARTUM CARE VISIT: CPT | Mod: CPTII,S$GLB,, | Performed by: OBSTETRICS & GYNECOLOGY

## 2022-02-21 PROCEDURE — 99999 PR PBB SHADOW E&M-EST. PATIENT-LVL III: ICD-10-PCS | Mod: PBBFAC,,, | Performed by: OBSTETRICS & GYNECOLOGY

## 2022-02-21 PROCEDURE — 0503F PR POSTPARTUM CARE VISIT: ICD-10-PCS | Mod: CPTII,S$GLB,, | Performed by: OBSTETRICS & GYNECOLOGY

## 2022-02-21 PROCEDURE — 99999 PR PBB SHADOW E&M-EST. PATIENT-LVL III: CPT | Mod: PBBFAC,,, | Performed by: OBSTETRICS & GYNECOLOGY

## 2022-02-21 NOTE — PROGRESS NOTES
Chayo Williamson is a 36 y.o. female  presents for a postpartum visit.  She is status post C section 5 weeks ago.  Her hospitalization was not complicated.  She is breastfeeding.  She desires no method for contraception.  She denies postpartum depression.    Her last pap was 2020- WNL, HPV negative    History reviewed. No pertinent past medical history.  Past Surgical History:   Procedure Laterality Date     SECTION       X 2     SECTION N/A 2022    Procedure:  SECTION;  Surgeon: Giselle Matthews MD;  Location: [facility] [place] L&D;  Service: OB/GYN;  Laterality: N/A;     Review of patient's allergies indicates:  No Known Allergies    Current Outpatient Medications:     PRENATAL 105-IRON-FOLIC AC-DHA ORAL, Take by mouth., Disp: , Rfl:     ferrous sulfate (FEOSOL) Tab tablet, Take 1 tablet by mouth daily with breakfast., Disp: , Rfl:     HYDROcodone-acetaminophen (NORCO) 5-325 mg per tablet, Take 1 tablet by mouth every 6 (six) hours as needed for Pain., Disp: 20 tablet, Rfl: 0    ibuprofen (ADVIL,MOTRIN) 600 MG tablet, Take 1 tablet (600 mg total) by mouth 3 (three) times daily., Disp: 30 tablet, Rfl: 1      Vitals:    22 1348   BP: 122/78   Pulse: 74       GENERAL: healthy  ABDOMEN: no masses, hepatosplenomegaly, no hernias  EXTERNAL GENITALIA POSTPARTUM: normal, well-healed, without lesions or masses   VAGINA POSTPARTUM: normal, well-healed, physiologic discharge, without lesions   CERVIX POSTPARTUM: normal, well-healed, without lesions   UTERUS POSTPARTUM: normal size, well involuted, firm, non-tender, ADNEXA POSTPARTUM: no masses palpable and nontender    Assessment:  Normal postpartum exam    Plan:  Routine follow up.

## 2022-02-25 ENCOUNTER — PATIENT MESSAGE (OUTPATIENT)
Dept: OBSTETRICS AND GYNECOLOGY | Facility: CLINIC | Age: 37
End: 2022-02-25
Payer: COMMERCIAL

## 2022-02-28 ENCOUNTER — TELEPHONE (OUTPATIENT)
Dept: OBSTETRICS AND GYNECOLOGY | Facility: CLINIC | Age: 37
End: 2022-02-28
Payer: COMMERCIAL

## 2022-02-28 NOTE — TELEPHONE ENCOUNTER
Spoke with Shara at [facility] about a breast pump order for pt. Advised we have not received it. States it was sent over today. Gave our fax# and they will resend

## 2022-02-28 NOTE — TELEPHONE ENCOUNTER
----- Message from Nusrat Chang sent at 2/28/2022 11:30 AM CST -----  Earl blanchard is calling in regards to pts breast pump can be contacted at [phone]

## 2022-03-10 ENCOUNTER — OFFICE VISIT (OUTPATIENT)
Dept: URGENT CARE | Facility: CLINIC | Age: 37
End: 2022-03-10
Payer: COMMERCIAL

## 2022-03-10 VITALS
RESPIRATION RATE: 16 BRPM | WEIGHT: 137 LBS | OXYGEN SATURATION: 98 % | BODY MASS INDEX: 27.62 KG/M2 | HEIGHT: 59 IN | TEMPERATURE: 98 F | SYSTOLIC BLOOD PRESSURE: 115 MMHG | HEART RATE: 76 BPM | DIASTOLIC BLOOD PRESSURE: 61 MMHG

## 2022-03-10 DIAGNOSIS — J02.9 SORE THROAT: Primary | ICD-10-CM

## 2022-03-10 LAB
CTP QC/QA: YES
CTP QC/QA: YES
MOLECULAR STREP A: NEGATIVE
SARS-COV-2 RDRP RESP QL NAA+PROBE: NEGATIVE

## 2022-03-10 PROCEDURE — 1159F PR MEDICATION LIST DOCUMENTED IN MEDICAL RECORD: ICD-10-PCS | Mod: CPTII,S$GLB,, | Performed by: NURSE PRACTITIONER

## 2022-03-10 PROCEDURE — 3008F BODY MASS INDEX DOCD: CPT | Mod: CPTII,S$GLB,, | Performed by: NURSE PRACTITIONER

## 2022-03-10 PROCEDURE — 87651 POCT STREP A MOLECULAR: ICD-10-PCS | Mod: QW,S$GLB,, | Performed by: NURSE PRACTITIONER

## 2022-03-10 PROCEDURE — 99213 PR OFFICE/OUTPT VISIT, EST, LEVL III, 20-29 MIN: ICD-10-PCS | Mod: S$GLB,,, | Performed by: NURSE PRACTITIONER

## 2022-03-10 PROCEDURE — U0002 COVID-19 LAB TEST NON-CDC: HCPCS | Mod: QW,S$GLB,, | Performed by: NURSE PRACTITIONER

## 2022-03-10 PROCEDURE — 87651 STREP A DNA AMP PROBE: CPT | Mod: QW,S$GLB,, | Performed by: NURSE PRACTITIONER

## 2022-03-10 PROCEDURE — 3074F PR MOST RECENT SYSTOLIC BLOOD PRESSURE < 130 MM HG: ICD-10-PCS | Mod: CPTII,S$GLB,, | Performed by: NURSE PRACTITIONER

## 2022-03-10 PROCEDURE — 99213 OFFICE O/P EST LOW 20 MIN: CPT | Mod: S$GLB,,, | Performed by: NURSE PRACTITIONER

## 2022-03-10 PROCEDURE — U0002: ICD-10-PCS | Mod: QW,S$GLB,, | Performed by: NURSE PRACTITIONER

## 2022-03-10 PROCEDURE — 3078F PR MOST RECENT DIASTOLIC BLOOD PRESSURE < 80 MM HG: ICD-10-PCS | Mod: CPTII,S$GLB,, | Performed by: NURSE PRACTITIONER

## 2022-03-10 PROCEDURE — 3074F SYST BP LT 130 MM HG: CPT | Mod: CPTII,S$GLB,, | Performed by: NURSE PRACTITIONER

## 2022-03-10 PROCEDURE — 1160F PR REVIEW ALL MEDS BY PRESCRIBER/CLIN PHARMACIST DOCUMENTED: ICD-10-PCS | Mod: CPTII,S$GLB,, | Performed by: NURSE PRACTITIONER

## 2022-03-10 PROCEDURE — 1159F MED LIST DOCD IN RCRD: CPT | Mod: CPTII,S$GLB,, | Performed by: NURSE PRACTITIONER

## 2022-03-10 PROCEDURE — 1160F RVW MEDS BY RX/DR IN RCRD: CPT | Mod: CPTII,S$GLB,, | Performed by: NURSE PRACTITIONER

## 2022-03-10 PROCEDURE — 3078F DIAST BP <80 MM HG: CPT | Mod: CPTII,S$GLB,, | Performed by: NURSE PRACTITIONER

## 2022-03-10 PROCEDURE — 3008F PR BODY MASS INDEX (BMI) DOCUMENTED: ICD-10-PCS | Mod: CPTII,S$GLB,, | Performed by: NURSE PRACTITIONER

## 2022-03-11 NOTE — PROGRESS NOTES
"Subjective:       Patient ID: Chayo Williamson is a 36 y.o. female.    Vitals:  height is 4' 11" (1.499 m) and weight is 62.1 kg (137 lb). Her temperature is 98.1 °F (36.7 °C). Her blood pressure is 115/61 and her pulse is 76. Her respiration is 16 and oxygen saturation is 98%.     Chief Complaint: Sore Throat    Pt states she has had a sore throat,cough and headache since yesterday.  Denies fever or sick contacts.  She is pumping exclusively.    Sore Throat   This is a new problem. The current episode started yesterday. The problem has been unchanged. Neither side of throat is experiencing more pain than the other. There has been no fever. The pain is at a severity of 7/10. The pain is moderate. Associated symptoms include coughing and headaches. Pertinent negatives include no abdominal pain, congestion, diarrhea, drooling, ear discharge, ear pain, hoarse voice, plugged ear sensation, neck pain, shortness of breath, stridor, swollen glands, trouble swallowing or vomiting. She has had no exposure to strep or mono. She has tried gargles and acetaminophen for the symptoms. The treatment provided no relief.       Constitution: Negative for chills, sweating, fatigue and fever.   HENT: Positive for postnasal drip and sore throat. Negative for ear pain, ear discharge, drooling, congestion, trouble swallowing and voice change.    Neck: Negative for neck pain.   Respiratory: Positive for cough. Negative for chest tightness, shortness of breath and stridor.    Gastrointestinal: Negative for abdominal pain, vomiting and diarrhea.   Neurological: Positive for headaches.       Objective:      Physical Exam   Constitutional: She is oriented to person, place, and time. She appears well-developed. She is cooperative.  Non-toxic appearance. She does not appear ill. No distress.   HENT:   Head: Normocephalic and atraumatic.   Ears:   Right Ear: Hearing, tympanic membrane, external ear and ear canal normal.   Left Ear: " Hearing, tympanic membrane, external ear and ear canal normal.   Nose: Nose normal. No mucosal edema, rhinorrhea or nasal deformity. No epistaxis. Right sinus exhibits no maxillary sinus tenderness and no frontal sinus tenderness. Left sinus exhibits no maxillary sinus tenderness and no frontal sinus tenderness.   Mouth/Throat: Uvula is midline and mucous membranes are normal. No trismus in the jaw. Normal dentition. No uvula swelling. Posterior oropharyngeal erythema present. No oropharyngeal exudate or posterior oropharyngeal edema.   Eyes: Conjunctivae and lids are normal. No scleral icterus.   Neck: Trachea normal and phonation normal. Neck supple. No edema present. No erythema present. No neck rigidity present.   Cardiovascular: Normal rate, regular rhythm, normal heart sounds and normal pulses.   Pulmonary/Chest: Effort normal and breath sounds normal. No respiratory distress. She has no decreased breath sounds. She has no rhonchi.   Abdominal: Normal appearance.   Musculoskeletal: Normal range of motion.         General: No deformity. Normal range of motion.   Neurological: She is alert and oriented to person, place, and time. She exhibits normal muscle tone. Coordination normal.   Skin: Skin is warm, dry, intact, not diaphoretic and not pale.   Psychiatric: Her speech is normal and behavior is normal. Judgment and thought content normal.   Nursing note and vitals reviewed.    Results for orders placed or performed in visit on 03/10/22   POCT Strep A, Molecular   Result Value Ref Range    Molecular Strep A, POC Negative Negative     Acceptable Yes    POCT COVID-19 Rapid Screening   Result Value Ref Range    POC Rapid COVID Negative Negative     Acceptable Yes            Assessment:       No diagnosis found.      Plan:       Lab reviewed.  There are no diagnoses linked to this encounter.     Patient Instructions   Please drink plenty of fluids.  Please get plenty of rest.  Please  return here or go to the Emergency Department for any concerns or worsening of condition.  If not allergic, please take over the counter Tylenol (Acetaminophen) and/or Motrin (Ibuprofen) as directed for control of pain and/or fever.  Please follow up with your primary care doctor or specialist as needed.    If you  smoke, please stop smoking.

## 2022-03-11 NOTE — PATIENT INSTRUCTIONS
Please drink plenty of fluids.  Please get plenty of rest.  Please return here or go to the Emergency Department for any concerns or worsening of condition.  If not allergic, please take over the counter Tylenol (Acetaminophen) and/or Motrin (Ibuprofen) as directed for control of pain and/or fever.  Please follow up with your primary care doctor or specialist as needed.    If you  smoke, please stop smoking.

## 2022-05-13 ENCOUNTER — PATIENT MESSAGE (OUTPATIENT)
Dept: ADMINISTRATIVE | Facility: HOSPITAL | Age: 37
End: 2022-05-13
Payer: COMMERCIAL

## 2022-05-13 ENCOUNTER — PATIENT OUTREACH (OUTPATIENT)
Dept: ADMINISTRATIVE | Facility: HOSPITAL | Age: 37
End: 2022-05-13
Payer: COMMERCIAL

## 2022-05-13 NOTE — PROGRESS NOTES
Patient due for the following    Lipid Panel       Not seen in 12 month outreach.  Portal message sent    Immunizations: reviewed and updated  Care Everywhere: triggered  Care Teams: up to date  Outreach: completed

## 2022-06-29 ENCOUNTER — PATIENT MESSAGE (OUTPATIENT)
Dept: PRIMARY CARE CLINIC | Facility: CLINIC | Age: 37
End: 2022-06-29
Payer: COMMERCIAL

## 2022-06-30 ENCOUNTER — OFFICE VISIT (OUTPATIENT)
Dept: PRIMARY CARE CLINIC | Facility: CLINIC | Age: 37
End: 2022-06-30
Payer: COMMERCIAL

## 2022-06-30 DIAGNOSIS — D64.9 ANEMIA, UNSPECIFIED TYPE: ICD-10-CM

## 2022-06-30 DIAGNOSIS — R05.9 COUGH: ICD-10-CM

## 2022-06-30 DIAGNOSIS — U07.1 COVID: Primary | ICD-10-CM

## 2022-06-30 PROCEDURE — 1160F PR REVIEW ALL MEDS BY PRESCRIBER/CLIN PHARMACIST DOCUMENTED: ICD-10-PCS | Mod: CPTII,95,, | Performed by: NURSE PRACTITIONER

## 2022-06-30 PROCEDURE — 99212 OFFICE O/P EST SF 10 MIN: CPT | Mod: 95,,, | Performed by: NURSE PRACTITIONER

## 2022-06-30 PROCEDURE — 1160F RVW MEDS BY RX/DR IN RCRD: CPT | Mod: CPTII,95,, | Performed by: NURSE PRACTITIONER

## 2022-06-30 PROCEDURE — 1159F PR MEDICATION LIST DOCUMENTED IN MEDICAL RECORD: ICD-10-PCS | Mod: CPTII,95,, | Performed by: NURSE PRACTITIONER

## 2022-06-30 PROCEDURE — 99212 PR OFFICE/OUTPT VISIT, EST, LEVL II, 10-19 MIN: ICD-10-PCS | Mod: 95,,, | Performed by: NURSE PRACTITIONER

## 2022-06-30 PROCEDURE — 1159F MED LIST DOCD IN RCRD: CPT | Mod: CPTII,95,, | Performed by: NURSE PRACTITIONER

## 2022-06-30 RX ORDER — PROMETHAZINE HYDROCHLORIDE AND DEXTROMETHORPHAN HYDROBROMIDE 6.25; 15 MG/5ML; MG/5ML
5 SYRUP ORAL EVERY 4 HOURS PRN
Qty: 150 ML | Refills: 0 | Status: SHIPPED | OUTPATIENT
Start: 2022-06-30 | End: 2022-06-30 | Stop reason: CLARIF

## 2022-06-30 RX ORDER — PROMETHAZINE HYDROCHLORIDE AND DEXTROMETHORPHAN HYDROBROMIDE 6.25; 15 MG/5ML; MG/5ML
5 SYRUP ORAL EVERY 4 HOURS PRN
Qty: 150 ML | Refills: 0 | Status: SHIPPED | OUTPATIENT
Start: 2022-06-30 | End: 2022-07-10

## 2022-06-30 NOTE — PROGRESS NOTES
[facility] Note    Chief Complaint    Covid postive    History of Present Illness      Chayo Williamson is a 36 y.o. female who presents via virtual visit today for testing positive for COVID yesterday and symptom management. She continues to have anemia after her baby was born in January of this year. She reports having cough and general body aches. Her  tested positive also. She denies any SOB, chest pain, N/V, unintentional weight loss, loss of appetite, fatigue, diarrhea, constipation. She is active daily and remains independent with ADL's.  She started day 1 of isolation yesterday. Her baby is being watched by someone else while patient and her  are in isolation. We spoke of her anemia. Patient will get labs drawn after home isolation done in 10 days.    Problem List Items Addressed This Visit    None     Visit Diagnoses     COVID    -  Primary    Relevant Medications    nirmatrelvir-ritonavir 150 mg x 2- 100 mg copackaged tablets (EUA)    Cough        Relevant Medications    promethazine-dextromethorphan (PROMETHAZINE-DM) 6.25-15 mg/5 mL Syrp    Anemia, unspecified type        Relevant Orders    CBC Auto Differential    Iron and TIBC    Ferritin          Review of Systems   Constitutional: Positive for malaise/fatigue.   HENT: Positive for congestion.    Eyes: Negative.    Respiratory: Positive for cough.    Cardiovascular: Negative.    Gastrointestinal: Negative.    Genitourinary: Negative.    Musculoskeletal: Negative.    Skin: Negative.    Neurological: Negative.    Endo/Heme/Allergies: Negative.    Psychiatric/Behavioral: Negative.         Past Medical History:  History reviewed. No pertinent past medical history.    Past Surgical History:  Past Surgical History:   Procedure Laterality Date     SECTION       X 2     SECTION N/A 2022    Procedure:  SECTION;  Surgeon: Giselle Matthews MD;  Location: [facility] L&D;  Service: OB/GYN;   Laterality: N/A;       Family History:  family history includes Breast cancer in her other; Breast cancer (age of onset: 45) in her mother; Colon cancer in her paternal aunt; Diabetes in her maternal grandmother; Hypertension in her maternal grandmother; Stroke in her maternal grandmother.   Family history was reviewed with patient.    Social History:  Social History     Socioeconomic History    Marital status:     Number of children: 2   Occupational History    Occupation:      Comment: [employer]   Tobacco Use    Smoking status: Never Smoker    Smokeless tobacco: Never Used   Substance and Sexual Activity    Alcohol use: Not Currently     Comment: social    Drug use: No    Sexual activity: Not Currently     Partners: Male     Birth control/protection: None   Social History Narrative    2 children: 1 son and 1 daughter         Medications:  Outpatient Encounter Medications as of 6/30/2022   Medication Sig Dispense Refill    (Magic mouthwash) 1:1:1 diphenhydramine(Benadryl) 12.5mg/5ml liq, aluminum & magnesium hydroxide-simethicone (Maalox), LIDOcaine viscous 2% Swish and spit 10 mLs every 4 (four) hours as needed (sore throat). 180 mL 0    ferrous sulfate (FEOSOL) Tab tablet Take 1 tablet by mouth daily with breakfast.      HYDROcodone-acetaminophen (NORCO) 5-325 mg per tablet Take 1 tablet by mouth every 6 (six) hours as needed for Pain. (Patient not taking: Reported on 3/10/2022) 20 tablet 0    ibuprofen (ADVIL,MOTRIN) 600 MG tablet Take 1 tablet (600 mg total) by mouth 3 (three) times daily. (Patient not taking: Reported on 3/10/2022) 30 tablet 1    nirmatrelvir-ritonavir 150 mg x 2- 100 mg copackaged tablets (EUA) Take 3 tablets by mouth 2 (two) times daily for 5 days. Each dose contains 2 nirmatrelvir (pink tablets) and 1 ritonavir (white tablet). Take all 3 tablets together 30 tablet 0    PRENATAL 105-IRON-FOLIC AC-DHA ORAL Take by mouth.       promethazine-dextromethorphan (PROMETHAZINE-DM) 6.25-15 mg/5 mL Syrp Take 5 mLs by mouth every 4 (four) hours as needed (as needed for cough). 150 mL 0    [DISCONTINUED] nirmatrelvir-ritonavir 150 mg x 2- 100 mg copackaged tablets (EUA) Take 3 tablets by mouth 2 (two) times daily for 5 days. Each dose contains 2 nirmatrelvir (pink tablets) and 1 ritonavir (white tablet). Take all 3 tablets together 30 tablet 0    [DISCONTINUED] promethazine-dextromethorphan (PROMETHAZINE-DM) 6.25-15 mg/5 mL Syrp Take 5 mLs by mouth every 4 (four) hours as needed. 150 mL 0     No facility-administered encounter medications on file as of 6/30/2022.       Allergies:  Review of patient's allergies indicates:  No Known Allergies    Health Maintenance:  Health Maintenance   Topic Date Due    Lipid Panel  Never done    TETANUS VACCINE  11/30/2031    Hepatitis C Screening  Completed     Health Maintenance Topics with due status: Not Due       Topic Last Completion Date    Cervical Cancer Screening 09/01/2020    TETANUS VACCINE 11/30/2021       Physical Exam       ]    Laboratory:  CBC:  Recent Labs   Lab 01/14/22  1118 01/15/22  0419 01/19/22  1537   WBC 7.26 12.01 6.58   RBC 3.93 L 3.58 L 3.56 L   Hemoglobin 9.4 L 8.5 L 8.5 L   Hematocrit 30.0 L 27.1 L 27.7 L   Platelets 165 177 231   MCV 76 L 76 L 78 L   MCH 23.9 L 23.7 L 23.9 L   MCHC 31.3 L 31.4 L 30.7 L     CMP:  Recent Labs   Lab 01/19/22  1537   Glucose 88   Calcium 8.2 L   Albumin 2.7 L   Total Protein 5.9 L   Sodium 140   Potassium 3.9   CO2 23   Chloride 106   BUN 11   Alkaline Phosphatase 87   ALT 34   AST 26   Total Bilirubin 0.4     URINALYSIS:       LIPIDS:      TSH:      A1C:        Radiology:        Assessment/Plan     Chayo Williamson is a 36 y.o.female with:    COVID  -     Discontinue: nirmatrelvir-ritonavir 150 mg x 2- 100 mg copackaged tablets (EUA); Take 3 tablets by mouth 2 (two) times daily for 5 days. Each dose contains 2 nirmatrelvir (pink tablets)  and 1 ritonavir (white tablet). Take all 3 tablets together  Dispense: 30 tablet; Refill: 0  -     nirmatrelvir-ritonavir 150 mg x 2- 100 mg copackaged tablets (EUA); Take 3 tablets by mouth 2 (two) times daily for 5 days. Each dose contains 2 nirmatrelvir (pink tablets) and 1 ritonavir (white tablet). Take all 3 tablets together  Dispense: 30 tablet; Refill: 0    Cough  -     Discontinue: promethazine-dextromethorphan (PROMETHAZINE-DM) 6.25-15 mg/5 mL Syrp; Take 5 mLs by mouth every 4 (four) hours as needed.  Dispense: 150 mL; Refill: 0  -     promethazine-dextromethorphan (PROMETHAZINE-DM) 6.25-15 mg/5 mL Syrp; Take 5 mLs by mouth every 4 (four) hours as needed (as needed for cough).  Dispense: 150 mL; Refill: 0    Anemia, unspecified type  -     CBC Auto Differential; Standing  -     Iron and TIBC; Future; Expected date: 06/30/2022  -     Ferritin; Future; Expected date: 06/30/2022        As above, continue current medications and maintain follow up with specialists.  Return to clinic as needed.    I spent 15 minutes via virtual visit on the day of this encounter for preparing, evaluating,  treating, and discussing plan of care with this patient.  Greater than 50% of this time was spent face to face with patient via virtual visit.  All questions were answered to patient's satisfaction.        Karen L Spencer, NP-C [facility]              Answers for HPI/ROS submitted by the patient on 6/30/2022  activity change: Yes  unexpected weight change: No  neck pain: No  hearing loss: No  rhinorrhea: No  trouble swallowing: No  eye discharge: No  visual disturbance: No  chest tightness: Yes  wheezing: Yes  chest pain: No  palpitations: No  blood in stool: No  constipation: No  vomiting: No  diarrhea: No  polydipsia: Yes  polyuria: No  difficulty urinating: No  hematuria: No  menstrual problem: No  dysuria: No  joint swelling: No  arthralgias: No  headaches: Yes  weakness: Yes  confusion: No  dysphoric mood:  No

## 2022-08-16 ENCOUNTER — OFFICE VISIT (OUTPATIENT)
Dept: URGENT CARE | Facility: CLINIC | Age: 37
End: 2022-08-16
Payer: COMMERCIAL

## 2022-08-16 VITALS
HEART RATE: 76 BPM | WEIGHT: 135 LBS | BODY MASS INDEX: 27.21 KG/M2 | OXYGEN SATURATION: 99 % | DIASTOLIC BLOOD PRESSURE: 48 MMHG | HEIGHT: 59 IN | TEMPERATURE: 98 F | RESPIRATION RATE: 16 BRPM | SYSTOLIC BLOOD PRESSURE: 116 MMHG

## 2022-08-16 DIAGNOSIS — R07.89 LEFT-SIDED CHEST WALL PAIN: ICD-10-CM

## 2022-08-16 DIAGNOSIS — R07.9 CHEST PAIN, UNSPECIFIED TYPE: Primary | ICD-10-CM

## 2022-08-16 LAB
B-HCG UR QL: NEGATIVE
BILIRUB UR QL STRIP: NEGATIVE
CTP QC/QA: YES
GLUCOSE UR QL STRIP: NEGATIVE
KETONES UR QL STRIP: NEGATIVE
LEUKOCYTE ESTERASE UR QL STRIP: NEGATIVE
PH, POC UA: 7 (ref 5–8)
POC BLOOD, URINE: POSITIVE
POC NITRATES, URINE: NEGATIVE
PROT UR QL STRIP: NEGATIVE
SP GR UR STRIP: 1.01 (ref 1–1.03)
UROBILINOGEN UR STRIP-ACNC: NORMAL (ref 0.1–1.1)

## 2022-08-16 PROCEDURE — 1160F PR REVIEW ALL MEDS BY PRESCRIBER/CLIN PHARMACIST DOCUMENTED: ICD-10-PCS | Mod: CPTII,S$GLB,, | Performed by: PHYSICIAN ASSISTANT

## 2022-08-16 PROCEDURE — 81003 URINALYSIS AUTO W/O SCOPE: CPT | Mod: QW,S$GLB,, | Performed by: PHYSICIAN ASSISTANT

## 2022-08-16 PROCEDURE — 93010 EKG 12-LEAD: ICD-10-PCS | Mod: S$GLB,,, | Performed by: INTERNAL MEDICINE

## 2022-08-16 PROCEDURE — 1159F MED LIST DOCD IN RCRD: CPT | Mod: CPTII,S$GLB,, | Performed by: PHYSICIAN ASSISTANT

## 2022-08-16 PROCEDURE — 3078F DIAST BP <80 MM HG: CPT | Mod: CPTII,S$GLB,, | Performed by: PHYSICIAN ASSISTANT

## 2022-08-16 PROCEDURE — 1159F PR MEDICATION LIST DOCUMENTED IN MEDICAL RECORD: ICD-10-PCS | Mod: CPTII,S$GLB,, | Performed by: PHYSICIAN ASSISTANT

## 2022-08-16 PROCEDURE — 81025 URINE PREGNANCY TEST: CPT | Mod: S$GLB,,, | Performed by: PHYSICIAN ASSISTANT

## 2022-08-16 PROCEDURE — 71046 X-RAY EXAM CHEST 2 VIEWS: CPT | Mod: S$GLB,,, | Performed by: RADIOLOGY

## 2022-08-16 PROCEDURE — 99214 PR OFFICE/OUTPT VISIT, EST, LEVL IV, 30-39 MIN: ICD-10-PCS | Mod: S$GLB,,, | Performed by: PHYSICIAN ASSISTANT

## 2022-08-16 PROCEDURE — 81003 POCT URINALYSIS, DIPSTICK, AUTOMATED, W/O SCOPE: ICD-10-PCS | Mod: QW,S$GLB,, | Performed by: PHYSICIAN ASSISTANT

## 2022-08-16 PROCEDURE — 1160F RVW MEDS BY RX/DR IN RCRD: CPT | Mod: CPTII,S$GLB,, | Performed by: PHYSICIAN ASSISTANT

## 2022-08-16 PROCEDURE — 93005 ELECTROCARDIOGRAM TRACING: CPT | Mod: S$GLB,,, | Performed by: PHYSICIAN ASSISTANT

## 2022-08-16 PROCEDURE — 3008F PR BODY MASS INDEX (BMI) DOCUMENTED: ICD-10-PCS | Mod: CPTII,S$GLB,, | Performed by: PHYSICIAN ASSISTANT

## 2022-08-16 PROCEDURE — 71046 XR CHEST PA AND LATERAL: ICD-10-PCS | Mod: S$GLB,,, | Performed by: RADIOLOGY

## 2022-08-16 PROCEDURE — 81025 POCT URINE PREGNANCY: ICD-10-PCS | Mod: S$GLB,,, | Performed by: PHYSICIAN ASSISTANT

## 2022-08-16 PROCEDURE — 3078F PR MOST RECENT DIASTOLIC BLOOD PRESSURE < 80 MM HG: ICD-10-PCS | Mod: CPTII,S$GLB,, | Performed by: PHYSICIAN ASSISTANT

## 2022-08-16 PROCEDURE — 93005 EKG 12-LEAD: ICD-10-PCS | Mod: S$GLB,,, | Performed by: PHYSICIAN ASSISTANT

## 2022-08-16 PROCEDURE — 99214 OFFICE O/P EST MOD 30 MIN: CPT | Mod: S$GLB,,, | Performed by: PHYSICIAN ASSISTANT

## 2022-08-16 PROCEDURE — 3074F PR MOST RECENT SYSTOLIC BLOOD PRESSURE < 130 MM HG: ICD-10-PCS | Mod: CPTII,S$GLB,, | Performed by: PHYSICIAN ASSISTANT

## 2022-08-16 PROCEDURE — 3008F BODY MASS INDEX DOCD: CPT | Mod: CPTII,S$GLB,, | Performed by: PHYSICIAN ASSISTANT

## 2022-08-16 PROCEDURE — 93010 ELECTROCARDIOGRAM REPORT: CPT | Mod: S$GLB,,, | Performed by: INTERNAL MEDICINE

## 2022-08-16 PROCEDURE — 3074F SYST BP LT 130 MM HG: CPT | Mod: CPTII,S$GLB,, | Performed by: PHYSICIAN ASSISTANT

## 2022-08-16 NOTE — PROGRESS NOTES
"Subjective:       Patient ID: Chayo Williamson is a 36 y.o. female.    Vitals:  height is 4' 11" (1.499 m) and weight is 61.2 kg (135 lb). Her temperature is 97.7 °F (36.5 °C). Her blood pressure is 116/48 (abnormal) and her pulse is 76. Her respiration is 16 and oxygen saturation is 99%.     Chief Complaint: Chest Pain    35yo female with hx migraine, hypothyroidism presents with c/o sharp pain on left side of chest wall that wraps around left lateral chest wall to back. Pain began yesterday afternoon unprovoked, pain has been present since onset, waxing and waning but not completely resolving. No abdominal pain. No recent falls.  Last BM today, normal.  States pain is worse when laughing, coughing, stretching arm over head- to elongate/stretch left side of torso, ROM of trunk/stretching movements/twisting, or putting pressure on the area. Denies fever or frequent cough. No dyspnea or substernal cp. No hx lung or heart disease. Non smoker. No cocaine use. No recent surgery, hospitalization, immobilization, cancer/chemo. No hx thrombosis. No urinary symptoms.  No injury or trauma to area. No rash. No LE edema or pain. Surgical hx includes 3 C sections.     Chest Pain   This is a new problem. The current episode started yesterday. The problem occurs constantly. The problem has been waxing and waning. The pain is present in the lateral region. The quality of the pain is described as sharp. The pain radiates to the mid back. Associated symptoms include back pain. Pertinent negatives include no abdominal pain, claudication, cough, diaphoresis, dizziness, exertional chest pressure, fever, headaches, hemoptysis, irregular heartbeat, leg pain, lower extremity edema, malaise/fatigue, nausea, near-syncope, numbness, orthopnea, palpitations, PND, shortness of breath, sputum production, syncope, vomiting or weakness. The pain is aggravated by breathing and coughing (palpation). She has tried NSAIDs for the symptoms. " The treatment provided no relief.         Constitution: Negative for chills, sweating, fatigue and fever.   HENT: Negative for ear pain, sinus pain and sore throat.    Neck: Negative for neck pain and neck stiffness.   Cardiovascular: Positive for chest pain. Negative for chest trauma, leg swelling, palpitations, sob on exertion and passing out.   Eyes: Negative for eye itching, eye pain and eye redness.   Respiratory: Negative for cough, sputum production, bloody sputum and shortness of breath.    Gastrointestinal: Negative for abdominal pain, nausea, vomiting and diarrhea.   Genitourinary: Negative for dysuria, frequency, urgency, flank pain and hematuria.   Musculoskeletal: Positive for pain and back pain. Negative for trauma.   Skin: Negative for color change and rash.   Neurological: Negative for dizziness, light-headedness, headaches, altered mental status, numbness and tingling.   Psychiatric/Behavioral: Negative for altered mental status.       Objective:      Physical Exam   Constitutional: She is oriented to person, place, and time. She appears well-developed. She is cooperative.  Non-toxic appearance. She does not appear ill. No distress.   HENT:   Head: Normocephalic and atraumatic.   Ears:   Right Ear: Hearing, tympanic membrane, external ear and ear canal normal.   Left Ear: Hearing, tympanic membrane, external ear and ear canal normal.   Nose: Nose normal. No mucosal edema, rhinorrhea or nasal deformity. No epistaxis. Right sinus exhibits no maxillary sinus tenderness and no frontal sinus tenderness. Left sinus exhibits no maxillary sinus tenderness and no frontal sinus tenderness.   Mouth/Throat: Uvula is midline, oropharynx is clear and moist and mucous membranes are normal. No trismus in the jaw. Normal dentition. No uvula swelling. No posterior oropharyngeal erythema.   Eyes: Conjunctivae and lids are normal. Right eye exhibits no discharge. Left eye exhibits no discharge. No scleral icterus.    Neck: Trachea normal and phonation normal. Neck supple.   Cardiovascular: Normal rate, regular rhythm, normal heart sounds and normal pulses.   Pulmonary/Chest: Effort normal and breath sounds normal. No accessory muscle usage or stridor. No tachypnea and no bradypnea. No respiratory distress. She has no decreased breath sounds. She has no wheezes. She has no rhonchi. She has no rales.               Abdominal: Normal appearance and bowel sounds are normal. She exhibits no distension, no pulsatile midline mass and no mass. Soft. There is no abdominal tenderness. There is no guarding, no tenderness at McBurney's point, negative Flores's sign, no left CVA tenderness and no right CVA tenderness.      Comments: Abdomen soft, non tender to palpation without rigidity or guarding.   Musculoskeletal: Normal range of motion.         General: No deformity. Normal range of motion.      Right lower leg: No edema.      Left lower leg: No edema.      Comments: No LE edema or swelling or pain   Neurological: She is alert and oriented to person, place, and time. She exhibits normal muscle tone. Coordination normal.   Skin: Skin is warm, dry, intact, not diaphoretic and not pale.   Psychiatric: Her speech is normal and behavior is normal. Judgment and thought content normal.   Nursing note and vitals reviewed.          The EKG shows a normal, regular Sinus Rhythm, at a rate of 69, there are not ST Changes. There is not a previous EKG for comparison.     Results for orders placed or performed in visit on 08/16/22   POCT Urinalysis, Dipstick, Automated, W/O Scope   Result Value Ref Range    POC Blood, Urine Positive (A) Negative    POC Bilirubin, Urine Negative Negative    POC Urobilinogen, Urine normal 0.1 - 1.1    POC Ketones, Urine Negative Negative    POC Protein, Urine Negative Negative    POC Nitrates, Urine Negative Negative    POC Glucose, Urine Negative Negative    pH, UA 7.0 5 - 8    POC Specific Gravity, Urine 1.015 1.003  - 1.029    POC Leukocytes, Urine Negative Negative   POCT urine pregnancy   Result Value Ref Range    POC Preg Test, Ur Negative Negative     Acceptable Yes      UA with 5 rbc, patient on menstrual period.   After examination of patient, this pain is higher than kidney area, and very unlikely to be from kidney stone.     XR CHEST PA AND LATERAL    Result Date: 8/16/2022  EXAMINATION: XR CHEST PA AND LATERAL CLINICAL HISTORY: Chest pain, unspecified TECHNIQUE: PA and lateral views of the chest were performed. COMPARISON: 12/20/2017 FINDINGS: The cardiomediastinal silhouette is not enlarged.  There is no pleural effusion.  The trachea is midline.  The lungs are symmetrically expanded bilaterally without evidence of acute parenchymal process. No large focal consolidation seen.  There is no pneumothorax.  The osseous structures are unremarkable.     1. No acute cardiopulmonary process. Electronically signed by: Joce James MD Date:    08/16/2022 Time:    19:22    Assessment:       1. Chest pain, unspecified type    2. Left-sided chest wall pain          Plan:       Pt is low risk for cardiovascular etiology and this cp does not sound cardiac in nature. Have considered PE, though no risk factors for PE either, Wells score for PE-0, low risk. EKG and CXR within normal limits. No abdominal ttp over spleen and no hx of recent accident, injury, or trauma, so spleen rupture unlikely.  Pain is reproducible and likely MSK, chest wall strain; also consider pleurisy/pleuritis. Have considered other etiology of chest pain and ran through my ddx with patient.  Have instructed to monitor closely, given f/u precautions, instructed to monitor for shingles rash and seek medical attention if rash develops, otherwise nsaids, rest, should improve on its own. All questions answered, pt expresses understanding and agrees with plan of care.     Chest pain, unspecified type  -     IN OFFICE EKG 12-LEAD (to Muse)  -     POCT  Urinalysis, Dipstick, Automated, W/O Scope  -     XR CHEST PA AND LATERAL; Future; Expected date: 08/16/2022  -     POCT urine pregnancy    Left-sided chest wall pain      Patient Instructions   - Rest.    - Drink plenty of fluids.    - Acetaminophen (tylenol) or Ibuprofen (advil,motrin) as directed as needed for fever/pain. Avoid tylenol if you have a history of liver disease. Do not take ibuprofen if you have a history of GI bleeding, kidney disease, or if you take blood thinners.     - Follow up with your PCP or specialty clinic as directed in the next 1-2 weeks if not improved or as needed.  You can call [phone] to schedule an appointment with the appropriate provider.    - Go to the ER or seek medical attention immediately if you develop new or worsening symptoms.     - You must understand that you have received an Urgent Care treatment only and that you may be released before all of your medical problems are known or treated.   - You, the patient, will arrange for follow up care as instructed.   - If your condition worsens or fails to improve we recommend that you receive another evaluation at the ER immediately or contact your PCP to discuss your concerns or return here.

## 2022-08-17 NOTE — PATIENT INSTRUCTIONS
- Rest.    - Drink plenty of fluids.    - Acetaminophen (tylenol) or Ibuprofen (advil,motrin) as directed as needed for fever/pain. Avoid tylenol if you have a history of liver disease. Do not take ibuprofen if you have a history of GI bleeding, kidney disease, or if you take blood thinners.     - Follow up with your PCP or specialty clinic as directed in the next 1-2 weeks if not improved or as needed.  You can call [phone] to schedule an appointment with the appropriate provider.    - Go to the ER or seek medical attention immediately if you develop new or worsening symptoms.     - You must understand that you have received an Urgent Care treatment only and that you may be released before all of your medical problems are known or treated.   - You, the patient, will arrange for follow up care as instructed.   - If your condition worsens or fails to improve we recommend that you receive another evaluation at the ER immediately or contact your PCP to discuss your concerns or return here.

## 2022-08-28 ENCOUNTER — OFFICE VISIT (OUTPATIENT)
Dept: URGENT CARE | Facility: CLINIC | Age: 37
End: 2022-08-28
Payer: COMMERCIAL

## 2022-08-28 VITALS
HEART RATE: 75 BPM | RESPIRATION RATE: 18 BRPM | WEIGHT: 135 LBS | DIASTOLIC BLOOD PRESSURE: 62 MMHG | HEIGHT: 59 IN | BODY MASS INDEX: 27.21 KG/M2 | OXYGEN SATURATION: 95 % | SYSTOLIC BLOOD PRESSURE: 109 MMHG | TEMPERATURE: 98 F

## 2022-08-28 DIAGNOSIS — H10.9 BACTERIAL CONJUNCTIVITIS OF LEFT EYE: Primary | ICD-10-CM

## 2022-08-28 PROCEDURE — 3008F PR BODY MASS INDEX (BMI) DOCUMENTED: ICD-10-PCS | Mod: CPTII,S$GLB,, | Performed by: NURSE PRACTITIONER

## 2022-08-28 PROCEDURE — 99213 PR OFFICE/OUTPT VISIT, EST, LEVL III, 20-29 MIN: ICD-10-PCS | Mod: S$GLB,,, | Performed by: NURSE PRACTITIONER

## 2022-08-28 PROCEDURE — 3008F BODY MASS INDEX DOCD: CPT | Mod: CPTII,S$GLB,, | Performed by: NURSE PRACTITIONER

## 2022-08-28 PROCEDURE — 1159F PR MEDICATION LIST DOCUMENTED IN MEDICAL RECORD: ICD-10-PCS | Mod: CPTII,S$GLB,, | Performed by: NURSE PRACTITIONER

## 2022-08-28 PROCEDURE — 3074F PR MOST RECENT SYSTOLIC BLOOD PRESSURE < 130 MM HG: ICD-10-PCS | Mod: CPTII,S$GLB,, | Performed by: NURSE PRACTITIONER

## 2022-08-28 PROCEDURE — 3074F SYST BP LT 130 MM HG: CPT | Mod: CPTII,S$GLB,, | Performed by: NURSE PRACTITIONER

## 2022-08-28 PROCEDURE — 1160F RVW MEDS BY RX/DR IN RCRD: CPT | Mod: CPTII,S$GLB,, | Performed by: NURSE PRACTITIONER

## 2022-08-28 PROCEDURE — 1160F PR REVIEW ALL MEDS BY PRESCRIBER/CLIN PHARMACIST DOCUMENTED: ICD-10-PCS | Mod: CPTII,S$GLB,, | Performed by: NURSE PRACTITIONER

## 2022-08-28 PROCEDURE — 3078F PR MOST RECENT DIASTOLIC BLOOD PRESSURE < 80 MM HG: ICD-10-PCS | Mod: CPTII,S$GLB,, | Performed by: NURSE PRACTITIONER

## 2022-08-28 PROCEDURE — 1159F MED LIST DOCD IN RCRD: CPT | Mod: CPTII,S$GLB,, | Performed by: NURSE PRACTITIONER

## 2022-08-28 PROCEDURE — 3078F DIAST BP <80 MM HG: CPT | Mod: CPTII,S$GLB,, | Performed by: NURSE PRACTITIONER

## 2022-08-28 PROCEDURE — 99213 OFFICE O/P EST LOW 20 MIN: CPT | Mod: S$GLB,,, | Performed by: NURSE PRACTITIONER

## 2022-08-28 RX ORDER — TOBRAMYCIN 3 MG/ML
1 SOLUTION/ DROPS OPHTHALMIC EVERY 4 HOURS
Qty: 5 ML | Refills: 0 | Status: SHIPPED | OUTPATIENT
Start: 2022-08-28 | End: 2022-09-04

## 2022-08-28 NOTE — PROGRESS NOTES
"Subjective:       Patient ID: Chayo Williamson is a 36 y.o. female.    Vitals:  height is 4' 11" (1.499 m) and weight is 61.2 kg (135 lb). Her temperature is 98 °F (36.7 °C). Her blood pressure is 109/62 and her pulse is 75. Her respiration is 18 and oxygen saturation is 95%.     Chief Complaint: Conjunctivitis    Eye Pain   The right eye is affected. This is a new problem. The current episode started yesterday. The problem occurs constantly. The problem has been unchanged. The injury mechanism is unknown. The pain is at a severity of 8/10. The pain is moderate. There is Known exposure to pink eye. She Does not wear contacts. Associated symptoms include an eye discharge, eye redness and itching. Pertinent negatives include no blurred vision, double vision, fever, foreign body sensation, nausea, photophobia, recent URI or vomiting. Treatments tried: hot towel. The treatment provided mild relief.     Constitution: Negative for fever.   Eyes:  Positive for eye discharge, eye itching, eye pain and eye redness. Negative for photophobia, double vision and blurred vision.   Gastrointestinal:  Negative for nausea and vomiting.     Objective:      Physical Exam   Constitutional: She is oriented to person, place, and time.   HENT:   Head: Normocephalic and atraumatic.   Eyes: Left eye exhibits discharge. Left conjunctiva is injected.   Cardiovascular: Normal rate.   Pulmonary/Chest: Effort normal. No respiratory distress.   Abdominal: Normal appearance.   Neurological: She is alert and oriented to person, place, and time.   Skin: Skin is warm and dry.   Psychiatric: Her behavior is normal. Mood normal.           Assessment:       1. Bacterial conjunctivitis of left eye          Plan:       Good hand washing.  Clean high contact surfaces  Antibiotic eyedrops and Systane drops      Bacterial conjunctivitis of left eye  -     tobramycin sulfate 0.3% (TOBREX) 0.3 % ophthalmic solution; Place 1 drop into the left eye " every 4 (four) hours. for 7 days  Dispense: 5 mL; Refill: 0

## 2022-09-17 ENCOUNTER — IMMUNIZATION (OUTPATIENT)
Dept: PRIMARY CARE CLINIC | Facility: CLINIC | Age: 37
End: 2022-09-17
Payer: COMMERCIAL

## 2022-09-17 DIAGNOSIS — Z23 NEED FOR VACCINATION: Primary | ICD-10-CM

## 2022-09-17 PROCEDURE — 0124A COVID-19, MRNA, LNP-S, BIVALENT BOOSTER, PF, 30 MCG/0.3 ML DOSE: CPT | Mod: CV19,PBBFAC | Performed by: INTERNAL MEDICINE

## 2022-09-17 PROCEDURE — 91312 COVID-19, MRNA, LNP-S, BIVALENT BOOSTER, PF, 30 MCG/0.3 ML DOSE: CPT | Mod: PBBFAC | Performed by: INTERNAL MEDICINE

## 2022-09-27 ENCOUNTER — OFFICE VISIT (OUTPATIENT)
Dept: URGENT CARE | Facility: CLINIC | Age: 37
End: 2022-09-27
Payer: COMMERCIAL

## 2022-09-27 VITALS
HEART RATE: 77 BPM | RESPIRATION RATE: 18 BRPM | TEMPERATURE: 98 F | DIASTOLIC BLOOD PRESSURE: 58 MMHG | WEIGHT: 134.94 LBS | HEIGHT: 59 IN | OXYGEN SATURATION: 99 % | BODY MASS INDEX: 27.2 KG/M2 | SYSTOLIC BLOOD PRESSURE: 119 MMHG

## 2022-09-27 DIAGNOSIS — R31.9 HEMATURIA, UNSPECIFIED TYPE: ICD-10-CM

## 2022-09-27 DIAGNOSIS — N92.6 MENSES, IRREGULAR: ICD-10-CM

## 2022-09-27 DIAGNOSIS — M46.1 SACROILIITIS: ICD-10-CM

## 2022-09-27 DIAGNOSIS — M79.604 PAIN OF RIGHT LOWER EXTREMITY: Primary | ICD-10-CM

## 2022-09-27 DIAGNOSIS — R31.21 ASYMPTOMATIC MICROSCOPIC HEMATURIA: ICD-10-CM

## 2022-09-27 LAB
B-HCG UR QL: NEGATIVE
BILIRUB UR QL STRIP: NEGATIVE
CTP QC/QA: YES
GLUCOSE UR QL STRIP: NEGATIVE
KETONES UR QL STRIP: NEGATIVE
LEUKOCYTE ESTERASE UR QL STRIP: NEGATIVE
PH, POC UA: 5.5 (ref 5–8)
POC BLOOD, URINE: POSITIVE
POC NITRATES, URINE: NEGATIVE
PROT UR QL STRIP: NEGATIVE
SP GR UR STRIP: 1.02 (ref 1–1.03)
UROBILINOGEN UR STRIP-ACNC: NORMAL (ref 0.1–1.1)

## 2022-09-27 PROCEDURE — 1159F PR MEDICATION LIST DOCUMENTED IN MEDICAL RECORD: ICD-10-PCS | Mod: CPTII,S$GLB,, | Performed by: FAMILY MEDICINE

## 2022-09-27 PROCEDURE — 3078F DIAST BP <80 MM HG: CPT | Mod: CPTII,S$GLB,, | Performed by: FAMILY MEDICINE

## 2022-09-27 PROCEDURE — 1159F MED LIST DOCD IN RCRD: CPT | Mod: CPTII,S$GLB,, | Performed by: FAMILY MEDICINE

## 2022-09-27 PROCEDURE — 99214 PR OFFICE/OUTPT VISIT, EST, LEVL IV, 30-39 MIN: ICD-10-PCS | Mod: S$GLB,,, | Performed by: FAMILY MEDICINE

## 2022-09-27 PROCEDURE — 3008F PR BODY MASS INDEX (BMI) DOCUMENTED: ICD-10-PCS | Mod: CPTII,S$GLB,, | Performed by: FAMILY MEDICINE

## 2022-09-27 PROCEDURE — 81003 URINALYSIS AUTO W/O SCOPE: CPT | Mod: QW,S$GLB,, | Performed by: FAMILY MEDICINE

## 2022-09-27 PROCEDURE — 81025 URINE PREGNANCY TEST: CPT | Mod: S$GLB,,, | Performed by: FAMILY MEDICINE

## 2022-09-27 PROCEDURE — 72170 X-RAY EXAM OF PELVIS: CPT | Mod: S$GLB,,, | Performed by: RADIOLOGY

## 2022-09-27 PROCEDURE — 81003 POCT URINALYSIS, DIPSTICK, AUTOMATED, W/O SCOPE: ICD-10-PCS | Mod: QW,S$GLB,, | Performed by: FAMILY MEDICINE

## 2022-09-27 PROCEDURE — 72170 XR PELVIS ROUTINE AP: ICD-10-PCS | Mod: S$GLB,,, | Performed by: RADIOLOGY

## 2022-09-27 PROCEDURE — 99214 OFFICE O/P EST MOD 30 MIN: CPT | Mod: S$GLB,,, | Performed by: FAMILY MEDICINE

## 2022-09-27 PROCEDURE — 3008F BODY MASS INDEX DOCD: CPT | Mod: CPTII,S$GLB,, | Performed by: FAMILY MEDICINE

## 2022-09-27 PROCEDURE — 81025 POCT URINE PREGNANCY: ICD-10-PCS | Mod: S$GLB,,, | Performed by: FAMILY MEDICINE

## 2022-09-27 PROCEDURE — 3078F PR MOST RECENT DIASTOLIC BLOOD PRESSURE < 80 MM HG: ICD-10-PCS | Mod: CPTII,S$GLB,, | Performed by: FAMILY MEDICINE

## 2022-09-27 PROCEDURE — 3074F PR MOST RECENT SYSTOLIC BLOOD PRESSURE < 130 MM HG: ICD-10-PCS | Mod: CPTII,S$GLB,, | Performed by: FAMILY MEDICINE

## 2022-09-27 PROCEDURE — 3074F SYST BP LT 130 MM HG: CPT | Mod: CPTII,S$GLB,, | Performed by: FAMILY MEDICINE

## 2022-09-27 RX ORDER — GABAPENTIN 300 MG/1
300 CAPSULE ORAL NIGHTLY
Qty: 30 CAPSULE | Refills: 11 | Status: SHIPPED | OUTPATIENT
Start: 2022-09-27 | End: 2023-09-27

## 2022-09-27 RX ORDER — METHYLPREDNISOLONE 4 MG/1
TABLET ORAL
Qty: 21 EACH | Refills: 0 | Status: SHIPPED | OUTPATIENT
Start: 2022-09-27 | End: 2022-10-18

## 2022-09-27 RX ORDER — CYCLOBENZAPRINE HCL 10 MG
10 TABLET ORAL NIGHTLY
Qty: 10 TABLET | Refills: 0 | Status: SHIPPED | OUTPATIENT
Start: 2022-09-27 | End: 2022-10-07

## 2022-09-27 NOTE — PROGRESS NOTES
"Subjective:       Patient ID: Chayo Williamson is a 37 y.o. female.    Vitals:  height is 4' 11" (1.499 m) and weight is 61.2 kg (134 lb 14.7 oz). Her temperature is 97.7 °F (36.5 °C). Her blood pressure is 119/58 (abnormal) and her pulse is 77. Her respiration is 18 and oxygen saturation is 99%.     Chief Complaint: Leg Pain (right)    Pt reports feeling pain in her right leg since yesterday morning. She reports not being able to put pressure on that leg. She complains of tightness in her leg when she sits down for too long. It    Leg Pain   The incident occurred 6 to 12 hours ago. The incident occurred at home. There was no injury mechanism. The pain is present in the right leg, right thigh, right knee, right ankle and right foot. The quality of the pain is described as aching. The pain is at a severity of 9/10. The pain is moderate. The pain has been Constant since onset. Associated symptoms include an inability to bear weight. Pertinent negatives include no loss of motion, loss of sensation, muscle weakness, numbness or tingling. She reports no foreign bodies present. She has tried acetaminophen for the symptoms. The treatment provided mild relief.     Neurological:  Negative for numbness.     Objective:      Physical Exam   Constitutional: She is oriented to person, place, and time. No distress. normal  HENT:   Head: Normocephalic and atraumatic.   Mouth/Throat: Mucous membranes are moist. Oropharynx is clear.   Eyes: Conjunctivae are normal. Pupils are equal, round, and reactive to light. Extraocular movement intact   Neck: Neck supple. No torticollis present.   Pulmonary/Chest: Effort normal. No stridor. No respiratory distress.   Abdominal: Normal appearance and bowel sounds are normal. She exhibits no distension. Soft. flat abdomen   Musculoskeletal:      Cervical back: She exhibits tenderness. She exhibits no spasm.      Thoracic back: Normal.      Lumbar back: Normal.      Comments: Tenderness " right sacroiliac left unremarkable   Neurological: no focal deficit. She is alert, oriented to person, place, and time and at baseline. She displays no weakness and normal reflexes. No cranial nerve deficit or sensory deficit. Gait abnormal. Coordination normal.   Skin: Skin is warm and dry. Capillary refill takes less than 2 seconds.   Psychiatric: Her behavior is normal. Mood, judgment and thought content normal.   Nursing note and vitals reviewed.      Assessment:Plan:     1. Pain of right lower extremity  - X-Ray Pelvis Routine AP; Future  - POCT Urinalysis, Dipstick, Automated, W/O Scope  - cyclobenzaprine (FLEXERIL) 10 MG tablet; Take 1 tablet (10 mg total) by mouth every evening. for 10 days  Dispense: 10 tablet; Refill: 0  - gabapentin (NEURONTIN) 300 MG capsule; Take 1 capsule (300 mg total) by mouth every evening.  Dispense: 30 capsule; Refill: 11  - methylPREDNISolone (MEDROL DOSEPACK) 4 mg tablet; use as directed  Dispense: 21 each; Refill: 0    2. Menses, irregular  - POCT urine pregnancy    3. Hematuria, unspecified type  - CULTURE, URINE       Labs discussed with pt will contact with any pending results

## 2022-11-17 ENCOUNTER — OFFICE VISIT (OUTPATIENT)
Dept: URGENT CARE | Facility: CLINIC | Age: 37
End: 2022-11-17
Payer: COMMERCIAL

## 2022-11-17 VITALS
TEMPERATURE: 98 F | HEART RATE: 86 BPM | RESPIRATION RATE: 16 BRPM | OXYGEN SATURATION: 97 % | BODY MASS INDEX: 26.61 KG/M2 | WEIGHT: 132 LBS | DIASTOLIC BLOOD PRESSURE: 64 MMHG | HEIGHT: 59 IN | SYSTOLIC BLOOD PRESSURE: 115 MMHG

## 2022-11-17 DIAGNOSIS — J06.9 VIRAL URI WITH COUGH: Primary | ICD-10-CM

## 2022-11-17 LAB
CTP QC/QA: YES
CTP QC/QA: YES
POC MOLECULAR INFLUENZA A AGN: NEGATIVE
POC MOLECULAR INFLUENZA B AGN: NEGATIVE
SARS-COV-2 AG RESP QL IA.RAPID: NEGATIVE

## 2022-11-17 PROCEDURE — 1159F PR MEDICATION LIST DOCUMENTED IN MEDICAL RECORD: ICD-10-PCS | Mod: CPTII,S$GLB,, | Performed by: PHYSICIAN ASSISTANT

## 2022-11-17 PROCEDURE — 87502 POCT INFLUENZA A/B MOLECULAR: ICD-10-PCS | Mod: QW,S$GLB,, | Performed by: PHYSICIAN ASSISTANT

## 2022-11-17 PROCEDURE — 3074F SYST BP LT 130 MM HG: CPT | Mod: CPTII,S$GLB,, | Performed by: PHYSICIAN ASSISTANT

## 2022-11-17 PROCEDURE — 3008F BODY MASS INDEX DOCD: CPT | Mod: CPTII,S$GLB,, | Performed by: PHYSICIAN ASSISTANT

## 2022-11-17 PROCEDURE — 3078F PR MOST RECENT DIASTOLIC BLOOD PRESSURE < 80 MM HG: ICD-10-PCS | Mod: CPTII,S$GLB,, | Performed by: PHYSICIAN ASSISTANT

## 2022-11-17 PROCEDURE — 3074F PR MOST RECENT SYSTOLIC BLOOD PRESSURE < 130 MM HG: ICD-10-PCS | Mod: CPTII,S$GLB,, | Performed by: PHYSICIAN ASSISTANT

## 2022-11-17 PROCEDURE — 3078F DIAST BP <80 MM HG: CPT | Mod: CPTII,S$GLB,, | Performed by: PHYSICIAN ASSISTANT

## 2022-11-17 PROCEDURE — 99214 PR OFFICE/OUTPT VISIT, EST, LEVL IV, 30-39 MIN: ICD-10-PCS | Mod: S$GLB,,, | Performed by: PHYSICIAN ASSISTANT

## 2022-11-17 PROCEDURE — 1160F PR REVIEW ALL MEDS BY PRESCRIBER/CLIN PHARMACIST DOCUMENTED: ICD-10-PCS | Mod: CPTII,S$GLB,, | Performed by: PHYSICIAN ASSISTANT

## 2022-11-17 PROCEDURE — 1159F MED LIST DOCD IN RCRD: CPT | Mod: CPTII,S$GLB,, | Performed by: PHYSICIAN ASSISTANT

## 2022-11-17 PROCEDURE — 99214 OFFICE O/P EST MOD 30 MIN: CPT | Mod: S$GLB,,, | Performed by: PHYSICIAN ASSISTANT

## 2022-11-17 PROCEDURE — 87811 SARS-COV-2 COVID19 W/OPTIC: CPT | Mod: QW,S$GLB,, | Performed by: PHYSICIAN ASSISTANT

## 2022-11-17 PROCEDURE — 1160F RVW MEDS BY RX/DR IN RCRD: CPT | Mod: CPTII,S$GLB,, | Performed by: PHYSICIAN ASSISTANT

## 2022-11-17 PROCEDURE — 87811 SARS CORONAVIRUS 2 ANTIGEN POCT, MANUAL READ: ICD-10-PCS | Mod: QW,S$GLB,, | Performed by: PHYSICIAN ASSISTANT

## 2022-11-17 PROCEDURE — 87502 INFLUENZA DNA AMP PROBE: CPT | Mod: QW,S$GLB,, | Performed by: PHYSICIAN ASSISTANT

## 2022-11-17 PROCEDURE — 3008F PR BODY MASS INDEX (BMI) DOCUMENTED: ICD-10-PCS | Mod: CPTII,S$GLB,, | Performed by: PHYSICIAN ASSISTANT

## 2022-11-17 RX ORDER — PREDNISONE 20 MG/1
40 TABLET ORAL DAILY
Qty: 6 TABLET | Refills: 0 | Status: SHIPPED | OUTPATIENT
Start: 2022-11-17 | End: 2022-11-20

## 2022-11-17 NOTE — PROGRESS NOTES
"Subjective:       Patient ID: Chayo Williamson is a 37 y.o. female.    Vitals:  height is 4' 11" (1.499 m) and weight is 59.9 kg (132 lb). Her temperature is 98.4 °F (36.9 °C). Her blood pressure is 115/64 and her pulse is 86. Her respiration is 16 and oxygen saturation is 97%.     Chief Complaint: URI    Pt presents with congestion, cough, fatigue, body aches, headaches, sinus pressure/pain. Symptoms began 2 days ago. No fever. 9 month old at home is sick. Has been taking theraflu.     URI   This is a new problem. The current episode started in the past 7 days (Tuesday). The problem has been gradually worsening. There has been no fever. Associated symptoms include congestion, coughing, sinus pain and sneezing. Pertinent negatives include no abdominal pain, chest pain, diarrhea, dysuria, ear pain, headaches, nausea, neck pain, rash, sore throat or vomiting. Associated symptoms comments: Muscle ache/ runny nose. Treatments tried: Theraflu. The treatment provided no relief.     Constitution: Positive for fatigue. Negative for chills, sweating and fever.   HENT:  Positive for congestion, sinus pain and sinus pressure. Negative for ear pain, ear discharge, foreign body in ear, postnasal drip and sore throat.    Neck: Negative for neck pain and neck stiffness.   Cardiovascular:  Negative for chest pain, leg swelling, palpitations and sob on exertion.   Eyes:  Negative for eye itching, eye pain, eye redness and photophobia.   Respiratory:  Positive for cough and sputum production. Negative for chest tightness, shortness of breath and stridor.    Gastrointestinal:  Negative for abdominal pain, nausea, vomiting and diarrhea.   Genitourinary:  Negative for dysuria, frequency, urgency, flank pain and hematuria.   Musculoskeletal:  Negative for trauma, joint pain, abnormal ROM of joint and back pain.   Skin:  Negative for color change, rash and wound.   Allergic/Immunologic: Positive for sneezing.   Neurological:  " Negative for dizziness, light-headedness, facial drooping, loss of balance, headaches, altered mental status, numbness and tingling.   Psychiatric/Behavioral:  Negative for altered mental status and confusion.      Objective:      Physical Exam   Constitutional: She is oriented to person, place, and time. She appears well-developed. She is cooperative.  Non-toxic appearance. She does not appear ill. No distress.   HENT:   Head: Normocephalic and atraumatic.   Ears:   Right Ear: Hearing, tympanic membrane, external ear and ear canal normal.   Left Ear: Hearing, tympanic membrane, external ear and ear canal normal.   Nose: Mucosal edema and rhinorrhea present. No nasal deformity. No epistaxis. Right sinus exhibits no maxillary sinus tenderness and no frontal sinus tenderness. Left sinus exhibits no maxillary sinus tenderness and no frontal sinus tenderness.   Mouth/Throat: Uvula is midline, oropharynx is clear and moist and mucous membranes are normal. No trismus in the jaw. Normal dentition. No uvula swelling. No oropharyngeal exudate, posterior oropharyngeal edema, posterior oropharyngeal erythema, tonsillar abscesses or cobblestoning. Tonsils are 1+ on the right. Tonsils are 1+ on the left. No tonsillar exudate.   Eyes: Conjunctivae and lids are normal. No scleral icterus.   Neck: Trachea normal and phonation normal. Neck supple. No edema present. No erythema present. No neck rigidity present.   Cardiovascular: Normal rate, regular rhythm, normal heart sounds and normal pulses.   Pulmonary/Chest: Effort normal and breath sounds normal. No accessory muscle usage or stridor. No tachypnea and no bradypnea. No respiratory distress. She has no decreased breath sounds. She has no wheezes. She has no rhonchi. She has no rales.   Abdominal: Normal appearance.   Musculoskeletal: Normal range of motion.         General: No deformity. Normal range of motion.   Lymphadenopathy:        Head (right side): Submandibular  adenopathy present.        Head (left side): Submandibular adenopathy present.     She has no cervical adenopathy.   Neurological: She is alert and oriented to person, place, and time. She exhibits normal muscle tone. Coordination normal.   Skin: Skin is warm, dry, intact, not diaphoretic and not pale.   Psychiatric: Her speech is normal and behavior is normal. Judgment and thought content normal.   Nursing note and vitals reviewed.        Results for orders placed or performed in visit on 11/17/22   POCT Influenza A/B Molecular   Result Value Ref Range    POC Molecular Influenza A Ag Negative Negative, Not Reported    POC Molecular Influenza B Ag Negative Negative, Not Reported     Acceptable Yes    SARS Coronavirus 2 Antigen, POCT Manual Read   Result Value Ref Range    SARS Coronavirus 2 Antigen Negative Negative     Acceptable Yes        Assessment:       1. Viral URI with cough          Plan:       - Discussed ddx, home care, tx options, and given follow up precautions.     Viral URI with cough  -     POCT Influenza A/B Molecular  -     SARS Coronavirus 2 Antigen, POCT Manual Read  -     predniSONE (DELTASONE) 20 MG tablet; Take 2 tablets (40 mg total) by mouth once daily. for 3 days  Dispense: 6 tablet; Refill: 0       Patient Instructions   - Rest.    - Drink plenty of fluids.  - Viral upper respiratory infections typically run their course in 10-14 days.     - Tylenol or Ibuprofen as directed as needed for fever/pain. Avoid tylenol if you have a history of liver disease. Do not take ibuprofen if you have a history of GI bleeding, kidney disease, or if you take blood thinners.     - You can take over-the-counter claritin, zyrtec, allegra, or xyzal as directed. These are antihistamines that can help with runny nose, nasal congestion, sneezing, and helps to dry up post-nasal drip, which usually causes sore throat and cough.   - If you do NOT have high blood pressure, you may use a  decongestant form (D)  of this medication (ie. Claritin- D, zyrtec-D, allegra-D) or if you do not take the D form, you can take sudafed (pseudoephedrine) over the counter, which is a decongestant. Do NOT take two decongestant (D) medications at the same time (such as mucinex-D and claritin-D or plain sudafed and claritin D)    - You can use Flonase (fluticasone) nasal spray as directed for sinus congestion and postnasal drip. This is a steroid nasal spray that works locally over time to decrease the inflammation in your nose/sinuses and help with allergic symptoms. This is not an quick- relief spray like afrin, but it works well if used daily.  Discontinue if you develop nose bleed  - use nasal saline prior to Flonase.  - Use Ocean Spray Nasal Saline 1-3 puffs each nostril every 2-3 hours then blow out onto tissue. This is to irrigate the nasal passage way to clear the sinus openings. Use until sinus problem resolved.    - you can take plain Mucinex (guaifenesin) 1200 mg twice a day to help loosen mucous.     -warm salt water gargles can help with sore throat    - warm tea with honey can help with cough. Honey is a natural cough suppressant.    - Dextromethorphan (DM) is a cough suppressant over the counter (ie. mucinex DM, robitussin, delsym; dayquil/nyquil has DM as well.)    - You received a steroid (prednisone) today.  This can elevate your blood pressure, elevate your blood sugar, water weight gain, nervous energy, redness to the face and dimpling of the skin where the shot goes in.   - Do not use steroids more than 3 times per year.   - If you have diabetes, please check you blood sugar frequently.  - If you have high blood pressure, please check your blood pressure frequently.     - Follow up with your PCP or specialty clinic as directed in the next 1-2 weeks if not improved or as needed.  You can call [phone] to schedule an appointment with the appropriate provider.      - Go to the ER if you develop  new or worsening symptoms.     - You must understand that you have received an Urgent Care treatment only and that you may be released before all of your medical problems are known or treated.   - You, the patient, will arrange for follow up care as instructed.   - If your condition worsens or fails to improve we recommend that you receive another evaluation at the ER immediately or contact your PCP to discuss your concerns or return here.

## 2022-11-17 NOTE — PATIENT INSTRUCTIONS
- Rest.    - Drink plenty of fluids.  - Viral upper respiratory infections typically run their course in 10-14 days.     - Tylenol or Ibuprofen as directed as needed for fever/pain. Avoid tylenol if you have a history of liver disease. Do not take ibuprofen if you have a history of GI bleeding, kidney disease, or if you take blood thinners.     - You can take over-the-counter claritin, zyrtec, allegra, or xyzal as directed. These are antihistamines that can help with runny nose, nasal congestion, sneezing, and helps to dry up post-nasal drip, which usually causes sore throat and cough.   - If you do NOT have high blood pressure, you may use a decongestant form (D)  of this medication (ie. Claritin- D, zyrtec-D, allegra-D) or if you do not take the D form, you can take sudafed (pseudoephedrine) over the counter, which is a decongestant. Do NOT take two decongestant (D) medications at the same time (such as mucinex-D and claritin-D or plain sudafed and claritin D)    - You can use Flonase (fluticasone) nasal spray as directed for sinus congestion and postnasal drip. This is a steroid nasal spray that works locally over time to decrease the inflammation in your nose/sinuses and help with allergic symptoms. This is not an quick- relief spray like afrin, but it works well if used daily.  Discontinue if you develop nose bleed  - use nasal saline prior to Flonase.  - Use Ocean Spray Nasal Saline 1-3 puffs each nostril every 2-3 hours then blow out onto tissue. This is to irrigate the nasal passage way to clear the sinus openings. Use until sinus problem resolved.    - you can take plain Mucinex (guaifenesin) 1200 mg twice a day to help loosen mucous.     -warm salt water gargles can help with sore throat    - warm tea with honey can help with cough. Honey is a natural cough suppressant.    - Dextromethorphan (DM) is a cough suppressant over the counter (ie. mucinex DM, robitussin, delsym; dayquil/nyquil has DM as  well.)    - You received a steroid (prednisone) today.  This can elevate your blood pressure, elevate your blood sugar, water weight gain, nervous energy, redness to the face and dimpling of the skin where the shot goes in.   - Do not use steroids more than 3 times per year.   - If you have diabetes, please check you blood sugar frequently.  - If you have high blood pressure, please check your blood pressure frequently.     - Follow up with your PCP or specialty clinic as directed in the next 1-2 weeks if not improved or as needed.  You can call [phone] to schedule an appointment with the appropriate provider.      - Go to the ER if you develop new or worsening symptoms.     - You must understand that you have received an Urgent Care treatment only and that you may be released before all of your medical problems are known or treated.   - You, the patient, will arrange for follow up care as instructed.   - If your condition worsens or fails to improve we recommend that you receive another evaluation at the ER immediately or contact your PCP to discuss your concerns or return here.

## 2024-12-21 NOTE — PATIENT INSTRUCTIONS
Goal Outcome Evaluation:  Plan of Care Reviewed With: patient        Progress: improving  Outcome Evaluation: pt up ad sylvia, bleeding is very light, and pt denies pain and pain medications. She is pumping regualrly, but still only getting drops, not enough to collect to give to baby per pt's report. She is bonding well with infant, and desires to go home today.                              Patient was instructed in using lumbar roll at work, and correct ergonomics at work.  Scap retractions 2x 10 reps 2-3x/day  AROM in all pain free ranges throughout day.

## 2025-05-12 NOTE — PATIENT INSTRUCTIONS
Gargle viscous lidocaine as needed before meals for throat pain.  Take tylenol/motrin as needed for pain.    Please follow up with your primary care provider within 2-5 days if your signs and symptoms have not resolved or worsen.     If your condition worsens or fails to improve we recommend that you receive another evaluation at the emergency room immediately or contact your primary medical clinic to discuss your concerns.   You must understand that you have received an Urgent Care treatment only and that you may be released before all of your medical problems are known or treated. You, the patient, will arrange for follow up care as instructed.         Self-Care for Sore Throats    Sore throats happen for many reasons, such as colds, allergies, and infections caused by viruses or bacteria. In any case, your throat becomes red and sore. Your goal for self-care is to reduce your discomfort while giving your throat a chance to heal.  Moisten and soothe your throat  Tips include the following:  · Try a sip of water first thing after waking up.  · Keep your throat moist by drinking 6 or more glasses of clear liquids every day.  · Run a cool-air humidifier in your room overnight.  · Avoid cigarette smoke.   · Suck on throat lozenges, cough drops, hard candy, ice chips, or frozen fruit-juice bars. Use the sugar-free versions if your diet or medical condition requires them.  Gargle to ease irritation  Gargling every hour or 2 can ease irritation. Try gargling with 1 of these solutions:  · 1/4 teaspoon of salt in 1/2 cup of warm water  · An over-the-counter anesthetic gargle  Use medicine for more relief  Over-the-counter medicine can reduce sore throat symptoms. Ask your pharmacist if you have questions about which medicine to use:  · Ease pain with anesthetic sprays. Aspirin or an aspirin substitute also helps. Remember, never give aspirin to anyone 18 or younger, or if you are already taking blood thinners.   · For sore  throats caused by allergies, try antihistamines to block the allergic reaction.  · Remember: unless a sore throat is caused by a bacterial infection, antibiotics wont help you.  Prevent future sore throats  Prevention tips include the following:  · Stop smoking or reduce contact with secondhand smoke. Smoke irritates the tender throat lining.  · Limit contact with pets and with allergy-causing substances, such as pollen and mold.  · When youre around someone with a sore throat or cold, wash your hands often to keep viruses or bacteria from spreading.  · Dont strain your vocal cords.  Call your healthcare provider  Contact your healthcare provider if you have:  · A temperature over 101°F (38.3°C)  · White spots on the throat  · Great difficulty swallowing  · Trouble breathing  · A skin rash  · Recent exposure to someone else with strep bacteria  · Severe hoarseness and swollen glands in the neck or jaw   Date Last Reviewed: 8/1/2016  © 2586-6801 [employer]. [address] [place] [address], [place] [address]. All rights reserved. This information is not intended as a substitute for professional medical care. Always follow your healthcare professional's instructions.         Yes - the patient is able to be screened